# Patient Record
Sex: MALE | Race: BLACK OR AFRICAN AMERICAN | Employment: PART TIME | URBAN - METROPOLITAN AREA
[De-identification: names, ages, dates, MRNs, and addresses within clinical notes are randomized per-mention and may not be internally consistent; named-entity substitution may affect disease eponyms.]

---

## 2020-11-12 ENCOUNTER — HOSPITAL ENCOUNTER (EMERGENCY)
Age: 20
Discharge: HOME | End: 2020-11-12
Payer: MEDICAID

## 2020-11-12 VITALS
HEIGHT: 64 IN | SYSTOLIC BLOOD PRESSURE: 121 MMHG | BODY MASS INDEX: 42.68 KG/M2 | TEMPERATURE: 97.5 F | DIASTOLIC BLOOD PRESSURE: 79 MMHG | OXYGEN SATURATION: 98 % | WEIGHT: 250 LBS | HEART RATE: 72 BPM | RESPIRATION RATE: 20 BRPM

## 2020-11-12 PROCEDURE — 96372 THER/PROPH/DIAG INJ SC/IM: CPT

## 2020-11-12 PROCEDURE — 6360000002 HC RX W HCPCS: Performed by: PHYSICIAN ASSISTANT

## 2020-11-12 PROCEDURE — 99212 OFFICE O/P EST SF 10 MIN: CPT

## 2020-11-12 RX ORDER — AMOXICILLIN 875 MG/1
875 TABLET, COATED ORAL 2 TIMES DAILY
Qty: 20 TABLET | Refills: 0 | Status: SHIPPED | OUTPATIENT
Start: 2020-11-12 | End: 2020-11-22

## 2020-11-12 RX ORDER — DEXAMETHASONE SODIUM PHOSPHATE 10 MG/ML
10 INJECTION, SOLUTION INTRAMUSCULAR; INTRAVENOUS ONCE
Status: COMPLETED | OUTPATIENT
Start: 2020-11-12 | End: 2020-11-12

## 2020-11-12 RX ADMIN — DEXAMETHASONE SODIUM PHOSPHATE 10 MG: 10 INJECTION, SOLUTION INTRAMUSCULAR; INTRAVENOUS at 14:12

## 2020-11-12 ASSESSMENT — PAIN DESCRIPTION - PROGRESSION: CLINICAL_PROGRESSION: NOT CHANGED

## 2020-11-12 ASSESSMENT — PAIN DESCRIPTION - PAIN TYPE: TYPE: ACUTE PAIN

## 2020-11-12 ASSESSMENT — PAIN SCALES - GENERAL: PAINLEVEL_OUTOF10: 6

## 2020-11-12 ASSESSMENT — PAIN DESCRIPTION - DESCRIPTORS: DESCRIPTORS: SORE

## 2020-11-12 ASSESSMENT — PAIN DESCRIPTION - FREQUENCY: FREQUENCY: CONTINUOUS

## 2020-11-12 ASSESSMENT — PAIN - FUNCTIONAL ASSESSMENT
PAIN_FUNCTIONAL_ASSESSMENT: ACTIVITIES ARE NOT PREVENTED
PAIN_FUNCTIONAL_ASSESSMENT: 0-10

## 2020-11-12 ASSESSMENT — PAIN DESCRIPTION - LOCATION: LOCATION: THROAT

## 2020-11-12 ASSESSMENT — PAIN DESCRIPTION - ONSET: ONSET: ON-GOING

## 2020-11-12 NOTE — ED PROVIDER NOTES
This is a 77-year-old male that presents to urgent care complaining of a sore throat. There for the past several days and he was having difficulty opening his mouth he states and swallowing. He denies any coughing or choking. Denies any fevers or chills. On first contact patient he appears to be in no acute distress. Review of Systems   Constitutional:        Pertinent positives and negatives are stated within HPI, all other systems reviewed and are negative. Physical Exam  Vitals signs and nursing note reviewed. Constitutional:       Appearance: He is well-developed. HENT:      Head: Normocephalic and atraumatic. Jaw: There is normal jaw occlusion. No trismus. Right Ear: Hearing, tympanic membrane, ear canal and external ear normal.      Left Ear: Hearing, tympanic membrane, ear canal and external ear normal.      Nose: Nose normal.      Right Sinus: No maxillary sinus tenderness or frontal sinus tenderness. Left Sinus: No maxillary sinus tenderness or frontal sinus tenderness. Mouth/Throat:      Pharynx: Uvula midline. Pharyngeal swelling and posterior oropharyngeal erythema present. No oropharyngeal exudate or uvula swelling. Tonsils: No tonsillar abscesses. Comments: Oropharynx is patent. Eyes:      General: Lids are normal.      Conjunctiva/sclera: Conjunctivae normal.      Pupils: Pupils are equal, round, and reactive to light. Neck:      Musculoskeletal: Normal range of motion and neck supple. Cardiovascular:      Rate and Rhythm: Normal rate and regular rhythm. Heart sounds: Normal heart sounds. No murmur. Pulmonary:      Effort: Pulmonary effort is normal.      Breath sounds: Normal breath sounds. Abdominal:      General: Bowel sounds are normal.      Palpations: Abdomen is soft. Abdomen is not rigid. Tenderness: There is no abdominal tenderness. There is no guarding or rebound. Skin:     General: Skin is warm and dry. prognosis. Their questions are answered at this time and they are agreeable with the plan.      --------------------------------- ADDITIONAL PROVIDER NOTES ---------------------------------      This patient is stable for discharge. I have shared the specific conditions for return, as well as the importance of follow-up. * NOTE: This report was transcribed using voice recognition software. Every effort was made to ensure accuracy; however, inadvertent computerized transcription errors may be present.    --------------------------------- IMPRESSION AND DISPOSITION ---------------------------------    IMPRESSION  1.  Acute pharyngitis, unspecified etiology        DISPOSITION  Disposition: Discharge to home  Patient condition is good         Kari Pan PA-C  11/12/20 [id number]

## 2021-02-15 ENCOUNTER — OFFICE VISIT (OUTPATIENT)
Dept: FAMILY MEDICINE CLINIC | Age: 21
End: 2021-02-15
Payer: MEDICAID

## 2021-02-15 VITALS
HEIGHT: 64 IN | SYSTOLIC BLOOD PRESSURE: 108 MMHG | HEART RATE: 94 BPM | OXYGEN SATURATION: 96 % | TEMPERATURE: 96.6 F | DIASTOLIC BLOOD PRESSURE: 74 MMHG | RESPIRATION RATE: 16 BRPM | BODY MASS INDEX: 49.7 KG/M2 | WEIGHT: 291.1 LBS

## 2021-02-15 DIAGNOSIS — E66.01 MORBID OBESITY WITH BMI OF 45.0-49.9, ADULT (HCC): Primary | ICD-10-CM

## 2021-02-15 DIAGNOSIS — Z11.59 ENCOUNTER FOR HEPATITIS C SCREENING TEST FOR LOW RISK PATIENT: ICD-10-CM

## 2021-02-15 DIAGNOSIS — H66.002 NON-RECURRENT ACUTE SUPPURATIVE OTITIS MEDIA OF LEFT EAR WITHOUT SPONTANEOUS RUPTURE OF TYMPANIC MEMBRANE: ICD-10-CM

## 2021-02-15 DIAGNOSIS — Z11.4 ENCOUNTER FOR SCREENING FOR HIV: ICD-10-CM

## 2021-02-15 PROCEDURE — G8427 DOCREV CUR MEDS BY ELIG CLIN: HCPCS | Performed by: FAMILY MEDICINE

## 2021-02-15 PROCEDURE — 99203 OFFICE O/P NEW LOW 30 MIN: CPT | Performed by: FAMILY MEDICINE

## 2021-02-15 PROCEDURE — G8417 CALC BMI ABV UP PARAM F/U: HCPCS | Performed by: FAMILY MEDICINE

## 2021-02-15 PROCEDURE — G8484 FLU IMMUNIZE NO ADMIN: HCPCS | Performed by: FAMILY MEDICINE

## 2021-02-15 PROCEDURE — 1036F TOBACCO NON-USER: CPT | Performed by: FAMILY MEDICINE

## 2021-02-15 RX ORDER — AMOXICILLIN 875 MG/1
875 TABLET, COATED ORAL 2 TIMES DAILY
Qty: 20 TABLET | Refills: 0 | Status: SHIPPED | OUTPATIENT
Start: 2021-02-15 | End: 2021-02-25

## 2021-02-15 SDOH — ECONOMIC STABILITY: TRANSPORTATION INSECURITY
IN THE PAST 12 MONTHS, HAS THE LACK OF TRANSPORTATION KEPT YOU FROM MEDICAL APPOINTMENTS OR FROM GETTING MEDICATIONS?: NOT ASKED

## 2021-02-15 SDOH — HEALTH STABILITY: MENTAL HEALTH: HOW OFTEN DO YOU HAVE A DRINK CONTAINING ALCOHOL?: NEVER

## 2021-02-15 SDOH — ECONOMIC STABILITY: TRANSPORTATION INSECURITY
IN THE PAST 12 MONTHS, HAS LACK OF TRANSPORTATION KEPT YOU FROM MEETINGS, WORK, OR FROM GETTING THINGS NEEDED FOR DAILY LIVING?: NOT ASKED

## 2021-02-15 SDOH — ECONOMIC STABILITY: INCOME INSECURITY: HOW HARD IS IT FOR YOU TO PAY FOR THE VERY BASICS LIKE FOOD, HOUSING, MEDICAL CARE, AND HEATING?: NOT HARD AT ALL

## 2021-02-15 ASSESSMENT — PATIENT HEALTH QUESTIONNAIRE - PHQ9
SUM OF ALL RESPONSES TO PHQ QUESTIONS 1-9: 2
SUM OF ALL RESPONSES TO PHQ QUESTIONS 1-9: 2

## 2021-02-15 NOTE — PROGRESS NOTES
Subjective:  24 y.o. male who presents to the office today with chief complaint:  Chief Complaint   Patient presents with    New Patient     Has not been to a doctor for 2 years. Here to [facility] . Past Medical History: ADHD. Medications: Vyvanse 50 mg, trazodone 50 mg- prescribed at [facility]. Allergies: NKDA. Family History: Parents living, healthy. Has two siblings- healthy. Social:    Education: - [employer]. Employment: Potentially moving to [place], [place] soon. Diet: Described as \"good. \" Eats fruits and vegetables. Exercise: Gym daily. Caffeine: No    Tobacco: None. Alcohol: None. Illicit Drugs: Marijuana- occasional.       Health Maintenance Due   Topic Date Due    Hepatitis C screen  2000    Varicella vaccine (1 of 2 - 2-dose childhood series) 01/26/2001    HIV screen  01/26/2015    Hepatitis A vaccine (2 of 2 - 2-dose series) 09/05/2019    Flu vaccine (1) 09/01/2020       Cancer History: None. Family Cancer History: none. Review of Systems    Review of Systems: All bolded are positive, all others are negative. General:  Fever, chills, diaphoresis, fatigue, malaise, night sweats, weight loss  Psychological:  Anxiety, disorientation, hallucinations. ENT:  Epistaxis, headaches, vertigo, visual changes. Cardiovascular:  Chest pain, irregular heartbeats, palpitations, paroxysmal nocturnal dyspnea. Respiratory:  Shortness of breath, coughing, sputum production, hemoptysis, wheezing, orthopnea.   Gastrointestinal:  Nausea, vomiting, diarrhea, heartburn, constipation, abdominal pain, hematemesis, hematochezia, melena, acholic stools  Genito-Urinary:  Dysuria, urgency, frequency, hematuria  Musculoskeletal:  Joint pain, joint stiffness, joint swelling, muscle pain  Neurology:  Headache, focal neurological deficits, weakness, numbness, paresthesia  Derm:  Rashes, ulcers, excoriations, bruising Extremities:  Decreased ROM, peripheral edema, mottling      Objective:  Vitals:    02/15/21 1034   BP: 108/74   Pulse: 94   Resp: 16   Temp: 96.6 °F (35.9 °C)   SpO2: 96%     Physical Exam  Constitutional:       General: He is not in acute distress. Appearance: He is well-developed. He is obese. He is not diaphoretic. HENT:      Head: Normocephalic and atraumatic. Right Ear: Tympanic membrane, ear canal and external ear normal.      Left Ear: External ear normal.      Ears:      Comments: Left tympanic membrane erythematous. Left canal unremarkable. Nose: Nose normal.      Mouth/Throat:      Pharynx: No oropharyngeal exudate. Eyes:      General:         Right eye: No discharge. Left eye: No discharge. Conjunctiva/sclera: Conjunctivae normal.      Pupils: Pupils are equal, round, and reactive to light. Neck:      Musculoskeletal: Normal range of motion and neck supple. Cardiovascular:      Rate and Rhythm: Normal rate and regular rhythm. Heart sounds: Normal heart sounds. No murmur. No friction rub. No gallop. Pulmonary:      Effort: Pulmonary effort is normal. No respiratory distress. Breath sounds: Normal breath sounds. No wheezing or rales. Abdominal:      General: Bowel sounds are normal. There is no distension. Palpations: Abdomen is soft. Tenderness: There is no abdominal tenderness. There is no guarding or rebound. Lymphadenopathy:      Cervical: No cervical adenopathy. Neurological:      Mental Status: He is alert and oriented to person, place, and time. Psychiatric:         Behavior: Behavior normal.         Thought Content: Thought content normal.         Judgment: Judgment normal.         Results for orders placed or performed during the hospital encounter of 14   EKG 12 Lead   Result Value Ref Range    ECG Report       --- Pediatric criteria used ---Sinus arrhythmia. Normal ECGPREVIOUS TRACIN/12/10 19.36 PHYSICIAN Physician :   Enrico Vargas      ECG Heart Rate ECG HEART RATE: 63 /min /min    ECG RR Interval ECG RR INTERVAL: 952 ms ms    ECG P Duration ECG P DURATION: 96 ms ms    ECG QRS Duration ECG QRS DURATION: 80 ms ms    P-R Interval ECG NE INTERVAL: 152 ms ms    Q-T Interval ECG QT INTERVAL: 380 ms ms    ECG QTC Interval ECG QTC INTERVAL: 389 ms ms    ECG QT Dispersion ECG QT DISPERSION: 60 ms ms    P Axis ECG P AXIS: -17 deg deg    ECG QRS Axis ECG QRS AXIS: 34 deg deg    T Axis ECG T AXIS: 38 deg deg         Assessment and Plan:  Patsy was seen today for new patient. Diagnoses and all orders for this visit:    Morbid obesity with BMI of 45.0-49.9, adult ([facility] [address]  -     Comprehensive Metabolic Panel; Future  -     CBC; Future  -     TSH without Reflex; Future  -     Lipid Panel; Future    Encounter for screening for HIV  -     HIV-1 AND HIV-2 ANTIBODIES; Future    Encounter for hepatitis C screening test for low risk patient  -     HEPATITIS C ANTIBODY; Future    Non-recurrent acute suppurative otitis media of left ear without spontaneous rupture of tympanic membrane  -     amoxicillin (AMOXIL) 875 MG tablet; Take 1 tablet by mouth 2 times daily for 10 days        1. Encounter to establish care with new physician: Discussion held regarding medical and social history. Vital signs reviewed. Physical exam completed. 2. Left otitis media: Amoxicillin 875 mg twice daily for 10 days. 3. Morbid obesity: Labs will be collected. Patient gained 40 pounds in the last 4 months. Follow-up in 1 year    Patient may come in sooner if needed for medical concerns. Patient advised to call at any time to cancel or re-scheduleor for any questions/concerns. Please note that >15 minutes was spent face-to-face with the patient gathering history, performing physical exam, discussing findings, counseling the patient, and determining planforward. All questions and concerns addressed and answered. Abelardo Goode,    2/15/21  12:28 PM

## 2021-02-16 DIAGNOSIS — Z11.4 ENCOUNTER FOR SCREENING FOR HIV: ICD-10-CM

## 2021-02-16 DIAGNOSIS — E66.01 MORBID OBESITY WITH BMI OF 45.0-49.9, ADULT (HCC): ICD-10-CM

## 2021-02-16 DIAGNOSIS — Z11.59 ENCOUNTER FOR HEPATITIS C SCREENING TEST FOR LOW RISK PATIENT: ICD-10-CM

## 2021-02-16 LAB
ALBUMIN SERPL-MCNC: 4.6 G/DL (ref 3.5–5.2)
ALP BLD-CCNC: 89 U/L (ref 40–129)
ALT SERPL-CCNC: 64 U/L (ref 0–40)
ANION GAP SERPL CALCULATED.3IONS-SCNC: 18 MMOL/L (ref 7–16)
AST SERPL-CCNC: 39 U/L (ref 0–39)
BILIRUB SERPL-MCNC: 0.3 MG/DL (ref 0–1.2)
BUN BLDV-MCNC: 12 MG/DL (ref 6–20)
CALCIUM SERPL-MCNC: 10.3 MG/DL (ref 8.6–10.2)
CHLORIDE BLD-SCNC: 112 MMOL/L (ref 98–107)
CHOLESTEROL, TOTAL: 239 MG/DL (ref 0–199)
CO2: 19 MMOL/L (ref 22–29)
CREAT SERPL-MCNC: 0.9 MG/DL (ref 0.7–1.2)
GFR AFRICAN AMERICAN: >60
GFR NON-AFRICAN AMERICAN: >60 ML/MIN/1.73
GLUCOSE BLD-MCNC: 97 MG/DL (ref 74–99)
HCT VFR BLD CALC: 47.1 % (ref 37–54)
HDLC SERPL-MCNC: 34 MG/DL
HEMOGLOBIN: 15.3 G/DL (ref 12.5–16.5)
LDL CHOLESTEROL CALCULATED: 184 MG/DL (ref 0–99)
MCH RBC QN AUTO: 28.5 PG (ref 26–35)
MCHC RBC AUTO-ENTMCNC: 32.5 % (ref 32–34.5)
MCV RBC AUTO: 87.9 FL (ref 80–99.9)
PDW BLD-RTO: 13.4 FL (ref 11.5–15)
PLATELET # BLD: 289 E9/L (ref 130–450)
PMV BLD AUTO: 10.5 FL (ref 7–12)
POTASSIUM SERPL-SCNC: 4.3 MMOL/L (ref 3.5–5)
RBC # BLD: 5.36 E12/L (ref 3.8–5.8)
SODIUM BLD-SCNC: 149 MMOL/L (ref 132–146)
TOTAL PROTEIN: 8.3 G/DL (ref 6.4–8.3)
TRIGL SERPL-MCNC: 107 MG/DL (ref 0–149)
TSH SERPL DL<=0.05 MIU/L-ACNC: 2.61 UIU/ML (ref 0.27–4.2)
VLDLC SERPL CALC-MCNC: 21 MG/DL
WBC # BLD: 5.3 E9/L (ref 4.5–11.5)

## 2021-02-17 LAB
HEPATITIS C ANTIBODY INTERPRETATION: NORMAL
HIV-1 AND HIV-2 ANTIBODIES: REACTIVE

## 2021-02-24 LAB
HIV INTERPRETATION: ABNORMAL
HIV-1 ANTIBODY: POSITIVE
HIV-2 AB: NEGATIVE

## 2021-02-25 DIAGNOSIS — E87.0 HYPERNATREMIA: ICD-10-CM

## 2021-02-25 DIAGNOSIS — Z21 NEWLY DIAGNOSED HIV-POSITIVE (HCC): Primary | ICD-10-CM

## 2021-03-05 DIAGNOSIS — Z21 NEWLY DIAGNOSED HIV-POSITIVE (HCC): ICD-10-CM

## 2021-03-05 DIAGNOSIS — E87.0 HYPERNATREMIA: ICD-10-CM

## 2021-03-05 LAB
ALBUMIN SERPL-MCNC: 4.2 G/DL (ref 3.5–5.2)
ALP BLD-CCNC: 68 U/L (ref 40–129)
ALT SERPL-CCNC: 52 U/L (ref 0–40)
ANION GAP SERPL CALCULATED.3IONS-SCNC: 10 MMOL/L (ref 7–16)
AST SERPL-CCNC: 32 U/L (ref 0–39)
BILIRUB SERPL-MCNC: 0.2 MG/DL (ref 0–1.2)
BUN BLDV-MCNC: 11 MG/DL (ref 6–20)
CALCIUM SERPL-MCNC: 9.1 MG/DL (ref 8.6–10.2)
CHLORIDE BLD-SCNC: 105 MMOL/L (ref 98–107)
CO2: 27 MMOL/L (ref 22–29)
CREAT SERPL-MCNC: 0.9 MG/DL (ref 0.7–1.2)
GFR AFRICAN AMERICAN: >60
GFR NON-AFRICAN AMERICAN: >60 ML/MIN/1.73
GLUCOSE BLD-MCNC: 99 MG/DL (ref 74–99)
POTASSIUM SERPL-SCNC: 4.2 MMOL/L (ref 3.5–5)
SODIUM BLD-SCNC: 142 MMOL/L (ref 132–146)
TOTAL PROTEIN: 7.4 G/DL (ref 6.4–8.3)

## 2021-03-13 LAB
DIRECT EXAM: NORMAL
SPECIMEN: NORMAL

## 2021-04-01 ENCOUNTER — HOSPITAL ENCOUNTER (OUTPATIENT)
Age: 21
Discharge: HOME | End: 2021-04-03
Payer: MEDICAID

## 2021-04-01 ENCOUNTER — HOSPITAL ENCOUNTER (OUTPATIENT)
Age: 21
Discharge: HOME | End: 2021-04-01
Payer: MEDICAID

## 2021-04-01 DIAGNOSIS — B20 HIV INFECTION, UNSPECIFIED SYMPTOM STATUS (HCC): ICD-10-CM

## 2021-04-01 LAB
ALBUMIN SERPL-MCNC: 4.4 G/DL (ref 3.5–5.2)
ALP BLD-CCNC: 78 U/L (ref 40–129)
ALT SERPL-CCNC: 75 U/L (ref 0–40)
ANION GAP SERPL CALCULATED.3IONS-SCNC: 10 MMOL/L (ref 7–16)
AST SERPL-CCNC: 39 U/L (ref 0–39)
BASOPHILS ABSOLUTE: 0.02 E9/L (ref 0–0.2)
BASOPHILS RELATIVE PERCENT: 0.4 % (ref 0–2)
BILIRUB SERPL-MCNC: 0.4 MG/DL (ref 0–1.2)
BILIRUBIN URINE: NEGATIVE
BLOOD, URINE: NEGATIVE
BUN BLDV-MCNC: 9 MG/DL (ref 6–20)
CALCIUM SERPL-MCNC: 9.2 MG/DL (ref 8.6–10.2)
CHLORIDE BLD-SCNC: 103 MMOL/L (ref 98–107)
CLARITY: CLEAR
CO2: 23 MMOL/L (ref 22–29)
COLOR: YELLOW
CREAT SERPL-MCNC: 0.8 MG/DL (ref 0.7–1.2)
EOSINOPHILS ABSOLUTE: 0.09 E9/L (ref 0.05–0.5)
EOSINOPHILS RELATIVE PERCENT: 1.8 % (ref 0–6)
GFR AFRICAN AMERICAN: >60
GFR NON-AFRICAN AMERICAN: >60 ML/MIN/1.73
GLUCOSE BLD-MCNC: 99 MG/DL (ref 74–99)
GLUCOSE URINE: NEGATIVE MG/DL
HCT VFR BLD CALC: 45.9 % (ref 37–54)
HEMOGLOBIN: 15.1 G/DL (ref 12.5–16.5)
IMMATURE GRANULOCYTES #: 0.01 E9/L
IMMATURE GRANULOCYTES %: 0.2 % (ref 0–5)
KETONES, URINE: NEGATIVE MG/DL
LEUKOCYTE ESTERASE, URINE: NEGATIVE
LYMPHOCYTES ABSOLUTE: 2.41 E9/L (ref 1.5–4)
LYMPHOCYTES RELATIVE PERCENT: 49.5 % (ref 20–42)
MCH RBC QN AUTO: 28.5 PG (ref 26–35)
MCHC RBC AUTO-ENTMCNC: 32.9 % (ref 32–34.5)
MCV RBC AUTO: 86.8 FL (ref 80–99.9)
MONOCYTES ABSOLUTE: 0.66 E9/L (ref 0.1–0.95)
MONOCYTES RELATIVE PERCENT: 13.6 % (ref 2–12)
NEUTROPHILS ABSOLUTE: 1.68 E9/L (ref 1.8–7.3)
NEUTROPHILS RELATIVE PERCENT: 34.5 % (ref 43–80)
NITRITE, URINE: NEGATIVE
PDW BLD-RTO: 13.2 FL (ref 11.5–15)
PH UA: 6 (ref 5–9)
PLATELET # BLD: 295 E9/L (ref 130–450)
PMV BLD AUTO: 9.6 FL (ref 7–12)
POTASSIUM SERPL-SCNC: 3.9 MMOL/L (ref 3.5–5)
PROTEIN UA: NEGATIVE MG/DL
RBC # BLD: 5.29 E12/L (ref 3.8–5.8)
SEDIMENTATION RATE, ERYTHROCYTE: 9 MM/HR (ref 0–15)
SODIUM BLD-SCNC: 136 MMOL/L (ref 132–146)
SPECIFIC GRAVITY UA: >=1.03 (ref 1–1.03)
TOTAL PROTEIN: 7.7 G/DL (ref 6.4–8.3)
UROBILINOGEN, URINE: 0.2 E.U./DL
WBC # BLD: 4.9 E9/L (ref 4.5–11.5)

## 2021-04-01 PROCEDURE — 87536 HIV-1 QUANT&REVRSE TRNSCRPJ: CPT

## 2021-04-01 PROCEDURE — 86481 TB AG RESPONSE T-CELL SUSP: CPT

## 2021-04-01 PROCEDURE — 85025 COMPLETE CBC W/AUTO DIFF WBC: CPT

## 2021-04-01 PROCEDURE — 80053 COMPREHEN METABOLIC PANEL: CPT

## 2021-04-01 PROCEDURE — 86357 NK CELLS TOTAL COUNT: CPT

## 2021-04-01 PROCEDURE — 86706 HEP B SURFACE ANTIBODY: CPT

## 2021-04-01 PROCEDURE — 86708 HEPATITIS A ANTIBODY: CPT

## 2021-04-01 PROCEDURE — 87491 CHLMYD TRACH DNA AMP PROBE: CPT

## 2021-04-01 PROCEDURE — 86704 HEP B CORE ANTIBODY TOTAL: CPT

## 2021-04-01 PROCEDURE — 81003 URINALYSIS AUTO W/O SCOPE: CPT

## 2021-04-01 PROCEDURE — 87340 HEPATITIS B SURFACE AG IA: CPT

## 2021-04-01 PROCEDURE — 87591 N.GONORRHOEAE DNA AMP PROB: CPT

## 2021-04-01 PROCEDURE — 86359 T CELLS TOTAL COUNT: CPT

## 2021-04-01 PROCEDURE — 86592 SYPHILIS TEST NON-TREP QUAL: CPT

## 2021-04-01 PROCEDURE — 81381 HLA I TYPING 1 ALLELE HR: CPT

## 2021-04-01 PROCEDURE — 86360 T CELL ABSOLUTE COUNT/RATIO: CPT

## 2021-04-01 PROCEDURE — 86403 PARTICLE AGGLUT ANTBDY SCRN: CPT

## 2021-04-01 PROCEDURE — 85651 RBC SED RATE NONAUTOMATED: CPT

## 2021-04-01 PROCEDURE — 36415 COLL VENOUS BLD VENIPUNCTURE: CPT

## 2021-04-02 LAB
CRYPTOCOCCAL ANTIGEN: NEGATIVE
HAV AB SERPL IA-ACNC: POSITIVE
HBV SURFACE AB TITR SER: NORMAL {TITER}
HEPATITIS B SURFACE ANTIGEN INTERPRETATION: NORMAL
RPR: NORMAL

## 2021-04-03 LAB
% CD 3 POS. LYMPH.: 89 % (ref 62–87)
% CD19: 6 % (ref 6–23)
% CD4: 15 % (ref 32–64)
% CD8: 72 % (ref 15–46)
% NK (CD56/16): 3 % (ref 4–26)
ABSOLUTE CD 3: 2274 CELLS/UL (ref 570–2400)
ABSOLUTE CD 4 HELPER: 390 CELLS/UL (ref 430–1800)
ABSOLUTE CD 8 (SUPP): 1824 CELLS/UL (ref 210–1200)
CD19 ABSOLUTE: 165 CELLS/UL (ref 91–610)
CD4/CD8 RATIO: 0.21 RATIO (ref 0.8–3.9)
HEPATITIS B CORE TOTAL ANTIBODY: NONREACTIVE
LYMPHOCYTE SUBSET PANEL 5 INFO: ABNORMAL
NATURAL KILLER CD16 AND CD56 ABSOLUTE: 88 CELLS/UL (ref 78–470)

## 2021-04-04 LAB
COMMENT: NORMAL
DIRECT EXAM: NORMAL
REPORT: NORMAL
SPECIMEN: NORMAL

## 2021-04-05 LAB
C. TRACHOMATIS DNA ,URINE: NEGATIVE
N. GONORRHOEAE DNA, URINE: NEGATIVE
SOURCE: NORMAL

## 2021-04-19 LAB
HLA-B*5701 GENOTYPING: NEGATIVE
HLA-B*5701 SPECIMEN: NORMAL

## 2021-04-26 ENCOUNTER — HOSPITAL ENCOUNTER (OUTPATIENT)
Dept: GENERAL RADIOLOGY | Age: 21
Discharge: HOME | End: 2021-04-28
Payer: MEDICAID

## 2021-04-26 PROCEDURE — 71046 X-RAY EXAM CHEST 2 VIEWS: CPT

## 2021-05-12 ENCOUNTER — HOSPITAL ENCOUNTER (OUTPATIENT)
Age: 21
Discharge: HOME | End: 2021-05-12
Payer: MEDICAID

## 2021-05-12 LAB
ALBUMIN SERPL-MCNC: 4.5 G/DL (ref 3.5–5.2)
ALP BLD-CCNC: 84 U/L (ref 40–129)
ALT SERPL-CCNC: 49 U/L (ref 0–40)
ANION GAP SERPL CALCULATED.3IONS-SCNC: 10 MMOL/L (ref 7–16)
AST SERPL-CCNC: 31 U/L (ref 0–39)
BASOPHILS ABSOLUTE: 0.02 E9/L (ref 0–0.2)
BASOPHILS RELATIVE PERCENT: 0.4 % (ref 0–2)
BILIRUB SERPL-MCNC: 0.2 MG/DL (ref 0–1.2)
BUN BLDV-MCNC: 13 MG/DL (ref 6–20)
CALCIUM SERPL-MCNC: 9.2 MG/DL (ref 8.6–10.2)
CHLORIDE BLD-SCNC: 102 MMOL/L (ref 98–107)
CO2: 25 MMOL/L (ref 22–29)
CREAT SERPL-MCNC: 0.9 MG/DL (ref 0.7–1.2)
EOSINOPHILS ABSOLUTE: 0.05 E9/L (ref 0.05–0.5)
EOSINOPHILS RELATIVE PERCENT: 0.9 % (ref 0–6)
GFR AFRICAN AMERICAN: >60
GFR NON-AFRICAN AMERICAN: >60 ML/MIN/1.73
GLUCOSE BLD-MCNC: 106 MG/DL (ref 74–99)
HCT VFR BLD CALC: 46.1 % (ref 37–54)
HEMOGLOBIN: 14.8 G/DL (ref 12.5–16.5)
IMMATURE GRANULOCYTES #: 0.01 E9/L
IMMATURE GRANULOCYTES %: 0.2 % (ref 0–5)
LYMPHOCYTES ABSOLUTE: 2.04 E9/L (ref 1.5–4)
LYMPHOCYTES RELATIVE PERCENT: 36.7 % (ref 20–42)
MCH RBC QN AUTO: 28.7 PG (ref 26–35)
MCHC RBC AUTO-ENTMCNC: 32.1 % (ref 32–34.5)
MCV RBC AUTO: 89.5 FL (ref 80–99.9)
MONOCYTES ABSOLUTE: 0.66 E9/L (ref 0.1–0.95)
MONOCYTES RELATIVE PERCENT: 11.9 % (ref 2–12)
NEUTROPHILS ABSOLUTE: 2.78 E9/L (ref 1.8–7.3)
NEUTROPHILS RELATIVE PERCENT: 49.9 % (ref 43–80)
PDW BLD-RTO: 13.6 FL (ref 11.5–15)
PLATELET # BLD: 365 E9/L (ref 130–450)
PMV BLD AUTO: 9.5 FL (ref 7–12)
POTASSIUM SERPL-SCNC: 4.2 MMOL/L (ref 3.5–5)
RBC # BLD: 5.15 E12/L (ref 3.8–5.8)
SODIUM BLD-SCNC: 137 MMOL/L (ref 132–146)
TOTAL PROTEIN: 7.9 G/DL (ref 6.4–8.3)
WBC # BLD: 5.6 E9/L (ref 4.5–11.5)

## 2021-05-12 PROCEDURE — 85025 COMPLETE CBC W/AUTO DIFF WBC: CPT

## 2021-05-12 PROCEDURE — 86360 T CELL ABSOLUTE COUNT/RATIO: CPT

## 2021-05-12 PROCEDURE — 36415 COLL VENOUS BLD VENIPUNCTURE: CPT

## 2021-05-12 PROCEDURE — 86357 NK CELLS TOTAL COUNT: CPT

## 2021-05-12 PROCEDURE — 80053 COMPREHEN METABOLIC PANEL: CPT

## 2021-05-12 PROCEDURE — 86359 T CELLS TOTAL COUNT: CPT

## 2021-05-12 PROCEDURE — 87536 HIV-1 QUANT&REVRSE TRNSCRPJ: CPT

## 2021-05-14 LAB
% CD 3 POS. LYMPH.: 82 % (ref 62–87)
% CD19: 14 % (ref 6–23)
% CD4: 23 % (ref 32–64)
% CD8: 55 % (ref 15–46)
% NK (CD56/16): 4 % (ref 4–26)
ABSOLUTE CD 3: 1927 CELLS/UL (ref 570–2400)
ABSOLUTE CD 4 HELPER: 540 CELLS/UL (ref 430–1800)
ABSOLUTE CD 8 (SUPP): 1303 CELLS/UL (ref 210–1200)
CD19 ABSOLUTE: 319 CELLS/UL (ref 91–610)
CD4/CD8 RATIO: 0.42 RATIO (ref 0.8–3.9)
LYMPHOCYTE SUBSET PANEL 5 INFO: ABNORMAL
NATURAL KILLER CD16 AND CD56 ABSOLUTE: 105 CELLS/UL (ref 78–470)

## 2021-05-18 LAB
DIRECT EXAM: NORMAL
SPECIMEN: NORMAL

## 2021-05-28 ENCOUNTER — HOSPITAL ENCOUNTER (EMERGENCY)
Age: 21
Discharge: HOME | End: 2021-05-28
Attending: EMERGENCY MEDICINE
Payer: MEDICAID

## 2021-05-28 ENCOUNTER — APPOINTMENT (OUTPATIENT)
Dept: CT IMAGING | Age: 21
End: 2021-05-28
Payer: MEDICAID

## 2021-05-28 VITALS
HEIGHT: 64 IN | OXYGEN SATURATION: 100 % | TEMPERATURE: 98.5 F | DIASTOLIC BLOOD PRESSURE: 74 MMHG | WEIGHT: 294 LBS | BODY MASS INDEX: 50.19 KG/M2 | HEART RATE: 54 BPM | RESPIRATION RATE: 18 BRPM | SYSTOLIC BLOOD PRESSURE: 129 MMHG

## 2021-05-28 DIAGNOSIS — R91.1 PULMONARY NODULE: ICD-10-CM

## 2021-05-28 DIAGNOSIS — J06.9 ACUTE UPPER RESPIRATORY INFECTION: Primary | ICD-10-CM

## 2021-05-28 LAB
ALBUMIN SERPL-MCNC: 4.2 G/DL (ref 3.5–5.2)
ALP BLD-CCNC: 90 U/L (ref 40–129)
ALT SERPL-CCNC: 48 U/L (ref 0–40)
ANION GAP SERPL CALCULATED.3IONS-SCNC: 11 MMOL/L (ref 7–16)
AST SERPL-CCNC: 29 U/L (ref 0–39)
BASOPHILS ABSOLUTE: 0.04 E9/L (ref 0–0.2)
BASOPHILS RELATIVE PERCENT: 0.6 % (ref 0–2)
BILIRUB SERPL-MCNC: 0.3 MG/DL (ref 0–1.2)
BILIRUBIN URINE: NEGATIVE
BLOOD, URINE: NEGATIVE
BUN BLDV-MCNC: 10 MG/DL (ref 6–20)
CALCIUM SERPL-MCNC: 9.3 MG/DL (ref 8.6–10.2)
CHLORIDE BLD-SCNC: 101 MMOL/L (ref 98–107)
CLARITY: CLEAR
CO2: 24 MMOL/L (ref 22–29)
COLOR: YELLOW
CREAT SERPL-MCNC: 0.8 MG/DL (ref 0.7–1.2)
EOSINOPHILS ABSOLUTE: 0.36 E9/L (ref 0.05–0.5)
EOSINOPHILS RELATIVE PERCENT: 5.6 % (ref 0–6)
GFR AFRICAN AMERICAN: >60
GFR NON-AFRICAN AMERICAN: >60 ML/MIN/1.73
GLUCOSE BLD-MCNC: 94 MG/DL (ref 74–99)
GLUCOSE URINE: NEGATIVE MG/DL
HCT VFR BLD CALC: 48.9 % (ref 37–54)
HEMOGLOBIN: 15.9 G/DL (ref 12.5–16.5)
IMMATURE GRANULOCYTES #: 0.02 E9/L
IMMATURE GRANULOCYTES %: 0.3 % (ref 0–5)
KETONES, URINE: NEGATIVE MG/DL
LEUKOCYTE ESTERASE, URINE: NEGATIVE
LYMPHOCYTES ABSOLUTE: 2.83 E9/L (ref 1.5–4)
LYMPHOCYTES RELATIVE PERCENT: 43.7 % (ref 20–42)
MCH RBC QN AUTO: 29.4 PG (ref 26–35)
MCHC RBC AUTO-ENTMCNC: 32.5 % (ref 32–34.5)
MCV RBC AUTO: 90.6 FL (ref 80–99.9)
MONOCYTES ABSOLUTE: 0.68 E9/L (ref 0.1–0.95)
MONOCYTES RELATIVE PERCENT: 10.5 % (ref 2–12)
NEUTROPHILS ABSOLUTE: 2.55 E9/L (ref 1.8–7.3)
NEUTROPHILS RELATIVE PERCENT: 39.3 % (ref 43–80)
NITRITE, URINE: NEGATIVE
PDW BLD-RTO: 14 FL (ref 11.5–15)
PH UA: 6.5 (ref 5–9)
PLATELET # BLD: 312 E9/L (ref 130–450)
PMV BLD AUTO: 9.6 FL (ref 7–12)
POTASSIUM REFLEX MAGNESIUM: 4.1 MMOL/L (ref 3.5–5)
PROTEIN UA: NEGATIVE MG/DL
RBC # BLD: 5.4 E12/L (ref 3.8–5.8)
SODIUM BLD-SCNC: 136 MMOL/L (ref 132–146)
SPECIFIC GRAVITY UA: 1.02 (ref 1–1.03)
TOTAL PROTEIN: 8.2 G/DL (ref 6.4–8.3)
TROPONIN, HIGH SENSITIVITY: <6 NG/L (ref 0–11)
UROBILINOGEN, URINE: 0.2 E.U./DL
WBC # BLD: 6.5 E9/L (ref 4.5–11.5)

## 2021-05-28 PROCEDURE — 84484 ASSAY OF TROPONIN QUANT: CPT

## 2021-05-28 PROCEDURE — 80053 COMPREHEN METABOLIC PANEL: CPT

## 2021-05-28 PROCEDURE — 93005 ELECTROCARDIOGRAM TRACING: CPT | Performed by: PHYSICIAN ASSISTANT

## 2021-05-28 PROCEDURE — 6360000004 HC RX CONTRAST MEDICATION: Performed by: RADIOLOGY

## 2021-05-28 PROCEDURE — 85025 COMPLETE CBC W/AUTO DIFF WBC: CPT

## 2021-05-28 PROCEDURE — 99283 EMERGENCY DEPT VISIT LOW MDM: CPT

## 2021-05-28 PROCEDURE — 81003 URINALYSIS AUTO W/O SCOPE: CPT

## 2021-05-28 PROCEDURE — 71275 CT ANGIOGRAPHY CHEST: CPT

## 2021-05-28 RX ORDER — AZITHROMYCIN 250 MG/1
TABLET, FILM COATED ORAL
Qty: 1 PACKET | Refills: 0 | Status: SHIPPED | OUTPATIENT
Start: 2021-05-28 | End: 2021-06-07

## 2021-05-28 RX ORDER — BROMPHENIRAMINE MALEATE, PSEUDOEPHEDRINE HYDROCHLORIDE, AND DEXTROMETHORPHAN HYDROBROMIDE 2; 30; 10 MG/5ML; MG/5ML; MG/5ML
5 SYRUP ORAL 4 TIMES DAILY PRN
Qty: 1 BOTTLE | Refills: 0 | Status: SHIPPED | OUTPATIENT
Start: 2021-05-28 | End: 2021-08-05 | Stop reason: ALTCHOICE

## 2021-05-28 RX ADMIN — IOPAMIDOL 75 ML: 755 INJECTION, SOLUTION INTRAVENOUS at 21:08

## 2021-05-28 ASSESSMENT — ENCOUNTER SYMPTOMS
CHEST TIGHTNESS: 0
ABDOMINAL PAIN: 0
SHORTNESS OF BREATH: 0
COUGH: 1

## 2021-05-28 ASSESSMENT — PAIN DESCRIPTION - LOCATION: LOCATION: CHEST

## 2021-05-28 ASSESSMENT — PAIN DESCRIPTION - PAIN TYPE: TYPE: ACUTE PAIN

## 2021-05-28 NOTE — LETTER
[facility] Emergency Department  [address] [place] [id number]  Phone: [phone]               May 28, 2021    Patient: Yumi Srivastava   YOB: 2000   Date of Visit: 5/28/2021       To Whom It May Concern:    Yumi Srivastava was seen and treated in our emergency department on 5/28/2021. He may return to work on 5/29/2021.       Sincerely,       Jennifer Solomon RN         Signature:__________________________________

## 2021-05-28 NOTE — ED NOTES
FIRST PROVIDER CONTACT ASSESSMENT NOTE      Department of Emergency Medicine   Admit Date: No admission date for patient encounter. Chief Complaint: Hemoptysis (began today, cough x 1 week ) and Shortness of Breath      History of Present Illness:   Ember Dominguez is a 24 y.o. male who presents to the ED for hemoptysis starting today as well as cough x 1 week. Did have recent travel to [place] 2 weeks ago (flew)).  Denies leg pain or swelling.         -----------------END OF FIRST PROVIDER CONTACT ASSESSMENT NOTE--------------  Electronically signed by GILDA Houser   DD: 5/28/21               Jennifer Houser  05/28/21 1935

## 2021-05-29 LAB
EKG ATRIAL RATE: 66 BPM
EKG P AXIS: 22 DEGREES
EKG P-R INTERVAL: 164 MS
EKG Q-T INTERVAL: 366 MS
EKG QRS DURATION: 82 MS
EKG QTC CALCULATION (BAZETT): 383 MS
EKG R AXIS: 11 DEGREES
EKG T AXIS: 24 DEGREES
EKG VENTRICULAR RATE: 66 BPM

## 2021-05-29 NOTE — ED PROVIDER NOTES
23 yo male presenting with 1 week of cough. He also started having some hemoptysis later on and that all started today. No current or mopped assist, no hypoxia, no respiratory distress. He does have HIV, reports taking the medications like he supposed to. No fevers or chills or nausea or vomiting, is not on prophylactic antibiotics, he could not tell me what his last CD4 count was, is not on other antibiotics at this time. He is awake calm alert, oriented x4. No distress, hypoxia. This is a week long episode of coughing now associate with mops this. Mild severity, 1 to 8-week duration, intermittent, gradual onset. History reviewed. No pertinent family history. Past Surgical History:   Procedure Laterality Date    ECHO COMPL W DOP COLOR FLOW  1/23/2014            Review of Systems   Constitutional: Negative for chills and fever. Respiratory: Positive for cough. Negative for chest tightness and shortness of breath. Hemoptysis     Cardiovascular: Negative for chest pain. Gastrointestinal: Negative for abdominal pain. All other systems reviewed and are negative. Physical Exam  Constitutional:       General: He is not in acute distress. Appearance: He is well-developed. HENT:      Head: Normocephalic and atraumatic. Eyes:      Pupils: Pupils are equal, round, and reactive to light. Neck:      Thyroid: No thyromegaly. Cardiovascular:      Rate and Rhythm: Normal rate and regular rhythm. Pulmonary:      Effort: Pulmonary effort is normal. No respiratory distress. Breath sounds: Normal breath sounds. No wheezing. Chest:      Chest wall: No deformity or tenderness. Abdominal:      General: There is no distension. Palpations: Abdomen is soft. There is no mass. Tenderness: There is no abdominal tenderness. There is no guarding or rebound. Musculoskeletal:         General: No tenderness. Normal range of motion.       Cervical back: Normal range of motion and neck supple. Skin:     General: Skin is warm and dry. Findings: No erythema. Neurological:      Mental Status: He is alert and oriented to person, place, and time. Cranial Nerves: No cranial nerve deficit. Psychiatric:         Mood and Affect: Mood normal. Mood is not anxious. Behavior: Behavior normal.          Procedures     [facility]              --------------------------------------------- PAST HISTORY ---------------------------------------------  Past Medical History:  has a past medical history of History of cardiovascular stress test.    Past Surgical History:  has a past surgical history that includes ECHO Compl W Dop Color Flow (1/23/2014). Social History:  reports that he has never smoked. He has never used smokeless tobacco. He reports current alcohol use. He reports current drug use. Drug: Marijuana. Family History: family history is not on file. The patients home medications have been reviewed. Allergies: Patient has no known allergies.     -------------------------------------------------- RESULTS -------------------------------------------------  Labs:  Results for orders placed or performed during the hospital encounter of 05/28/21   CBC Auto Differential   Result Value Ref Range    WBC 6.5 4.5 - 11.5 E9/L    RBC 5.40 3.80 - 5.80 E12/L    Hemoglobin 15.9 12.5 - 16.5 g/dL    Hematocrit 48.9 37.0 - 54.0 %    MCV 90.6 80.0 - 99.9 fL    MCH 29.4 26.0 - 35.0 pg    MCHC 32.5 32.0 - 34.5 %    RDW 14.0 11.5 - 15.0 fL    Platelets 530 179 - 484 E9/L    MPV 9.6 7.0 - 12.0 fL    Neutrophils % 39.3 (L) 43.0 - 80.0 %    Immature Granulocytes % 0.3 0.0 - 5.0 %    Lymphocytes % 43.7 (H) 20.0 - 42.0 %    Monocytes % 10.5 2.0 - 12.0 %    Eosinophils % 5.6 0.0 - 6.0 %    Basophils % 0.6 0.0 - 2.0 %    Neutrophils Absolute 2.55 1.80 - 7.30 E9/L    Immature Granulocytes # 0.02 E9/L    Lymphocytes Absolute 2.83 1.50 - 4.00 E9/L    Monocytes Absolute 0.68 0.10 - 0.95 E9/L Eosinophils Absolute 0.36 0.05 - 0.50 E9/L    Basophils Absolute 0.04 0.00 - 0.20 E9/L   Comprehensive Metabolic Panel w/ Reflex to MG   Result Value Ref Range    Sodium 136 132 - 146 mmol/L    Potassium reflex Magnesium 4.1 3.5 - 5.0 mmol/L    Chloride 101 98 - 107 mmol/L    CO2 24 22 - 29 mmol/L    Anion Gap 11 7 - 16 mmol/L    Glucose 94 74 - 99 mg/dL    BUN 10 6 - 20 mg/dL    CREATININE 0.8 0.7 - 1.2 mg/dL    GFR Non-African American >60 >=60 mL/min/1.73    GFR African American >60     Calcium 9.3 8.6 - 10.2 mg/dL    Total Protein 8.2 6.4 - 8.3 g/dL    Albumin 4.2 3.5 - 5.2 g/dL    Total Bilirubin 0.3 0.0 - 1.2 mg/dL    Alkaline Phosphatase 90 40 - 129 U/L    ALT 48 (H) 0 - 40 U/L    AST 29 0 - 39 U/L   Troponin   Result Value Ref Range    Troponin, High Sensitivity <6 0 - 11 ng/L   Urinalysis, reflex to microscopic   Result Value Ref Range    Color, UA Yellow Straw/Yellow    Clarity, UA Clear Clear    Glucose, Ur Negative Negative mg/dL    Bilirubin Urine Negative Negative    Ketones, Urine Negative Negative mg/dL    Specific Gravity, UA 1.025 1.005 - 1.030    Blood, Urine Negative Negative    pH, UA 6.5 5.0 - 9.0    Protein, UA Negative Negative mg/dL    Urobilinogen, Urine 0.2 <2.0 E.U./dL    Nitrite, Urine Negative Negative    Leukocyte Esterase, Urine Negative Negative   EKG 12 Lead   Result Value Ref Range    Ventricular Rate 66 BPM    Atrial Rate 66 BPM    P-R Interval 164 ms    QRS Duration 82 ms    Q-T Interval 366 ms    QTc Calculation (Bazett) 383 ms    P Axis 22 degrees    R Axis 11 degrees    T Axis 24 degrees       Radiology:  CTA PULMONARY W CONTRAST   Final Result   Addendum 1 of 1   ADDENDUM:   Repeat images obtained at 2213 hours after re administration of    intravenous   contrast show more adequate opacification of the pulmonary arteries. There is no evidence of pulmonary emboli. Subsegmental pulmonary arteries are suboptimally evaluated due to    respiratory   motion artifact. Final   Very limited opacification of the pulmonary arteries. Significant pulmonary   emboli cannot be excluded based on this examination. Recommend repeat or   correlation with VQ scan      Small focal area of ground-glass nodularity in the right upper lobe measuring   approximately 9 mm in diameter. In this age group this is likely   inflammatory. Short interval follow-up recommended to assess for resolution. No other areas of airspace consolidation or pleural effusions.                ------------------------- NURSING NOTES AND VITALS REVIEWED ---------------------------  Date / Time Roomed:  5/28/2021  8:20 PM  ED Bed Assignment:  25/25    The nursing notes within the ED encounter and vital signs as below have been reviewed. /74   Pulse 54   Temp 98.5 °F (36.9 °C) (Oral)   Resp 18   Ht 5' 4\" (1.626 m)   Wt 294 lb (133.4 kg)   SpO2 100%   BMI 50.46 kg/m²   Oxygen Saturation Interpretation: Normal      ------------------------------------------ PROGRESS NOTES ------------------------------------------  I have spoken with the patient and discussed todays results, in addition to providing specific details for the plan of care and counseling regarding the diagnosis and prognosis. Their questions are answered at this time and they are agreeable with the plan. I discussed at length with them reasons for immediate return here for re evaluation. They will followup with primary care by calling their office tomorrow. --------------------------------- ADDITIONAL PROVIDER NOTES ---------------------------------  At this time the patient is without objective evidence of an acute process requiring hospitalization or inpatient management. They have remained hemodynamically stable throughout their entire ED visit and are stable for discharge with outpatient follow-up.      The plan has been discussed in detail and they are aware of the specific conditions for emergent return, as well as the importance of follow-up. New Prescriptions    AZITHROMYCIN (ZITHROMAX Z-SHARON) 250 MG TABLET    Take 2 tabs on day one, followed by 1 tablet daily for 4 days. BROMPHENIRAMINE-PSEUDOEPHEDRINE-DM (BROMFED DM) 2-30-10 MG/5ML SYRUP    Take 5 mLs by mouth 4 times daily as needed for Cough       Diagnosis:  1. Acute upper respiratory infection    2. Pulmonary nodule        Disposition:  Patient's disposition: Discharge to home  Patient's condition is stable.               Paul Hammond DO  05/28/21 [id number]

## 2021-06-26 ENCOUNTER — HOSPITAL ENCOUNTER (EMERGENCY)
Age: 21
Discharge: HOME | End: 2021-06-26
Payer: MEDICAID

## 2021-06-26 VITALS
SYSTOLIC BLOOD PRESSURE: 115 MMHG | OXYGEN SATURATION: 98 % | HEIGHT: 64 IN | WEIGHT: 289 LBS | TEMPERATURE: 98 F | RESPIRATION RATE: 18 BRPM | DIASTOLIC BLOOD PRESSURE: 77 MMHG | HEART RATE: 60 BPM | BODY MASS INDEX: 49.34 KG/M2

## 2021-06-26 DIAGNOSIS — K08.89 PAIN, DENTAL: Primary | ICD-10-CM

## 2021-06-26 PROCEDURE — 99211 OFF/OP EST MAY X REQ PHY/QHP: CPT

## 2021-06-26 RX ORDER — IBUPROFEN 600 MG/1
600 TABLET ORAL EVERY 6 HOURS PRN
Qty: 20 TABLET | Refills: 0 | Status: SHIPPED | OUTPATIENT
Start: 2021-06-26 | End: 2021-08-05 | Stop reason: ALTCHOICE

## 2021-06-26 RX ORDER — PENICILLIN V POTASSIUM 500 MG/1
500 TABLET ORAL 4 TIMES DAILY
Qty: 40 TABLET | Refills: 0 | Status: SHIPPED | OUTPATIENT
Start: 2021-06-26 | End: 2021-07-06

## 2021-06-26 ASSESSMENT — PAIN DESCRIPTION - FREQUENCY: FREQUENCY: CONTINUOUS

## 2021-06-26 ASSESSMENT — PAIN DESCRIPTION - PROGRESSION: CLINICAL_PROGRESSION: GRADUALLY WORSENING

## 2021-06-26 ASSESSMENT — PAIN DESCRIPTION - ONSET: ONSET: ON-GOING

## 2021-06-26 ASSESSMENT — PAIN DESCRIPTION - ORIENTATION: ORIENTATION: RIGHT;LOWER;POSTERIOR

## 2021-06-26 ASSESSMENT — PAIN - FUNCTIONAL ASSESSMENT: PAIN_FUNCTIONAL_ASSESSMENT: ACTIVITIES ARE NOT PREVENTED

## 2021-06-26 ASSESSMENT — PAIN DESCRIPTION - DESCRIPTORS: DESCRIPTORS: THROBBING;ACHING

## 2021-06-26 ASSESSMENT — PAIN SCALES - GENERAL: PAINLEVEL_OUTOF10: 7

## 2021-06-26 ASSESSMENT — PAIN DESCRIPTION - PAIN TYPE: TYPE: ACUTE PAIN

## 2021-06-26 ASSESSMENT — PAIN DESCRIPTION - LOCATION: LOCATION: MOUTH

## 2021-06-27 NOTE — ED PROVIDER NOTES
[facility]  Department of Emergency Medicine   ED  Encounter Note  Admit Date/RoomTime: 2021  8:08 PM  ED Room:     NAME: Ruth De Jesus  : 2000  MRN: [id number]     Chief Complaint:  Dental Pain (Lower Back Right  ~  Pt Requesting WORK EXCUSE)    History of Present Illness        Ruth De Jesus is a 24 y.o. old male who presents to the emergency department with a complaint of dental pain. Patient states his right lower tooth has been hurting him now for the past 1 day. He denies any injury. Denies eating anything hard and hurting that tooth. Patient denies ear pain. Denies sore throat. He rates the pain a 7 out of 10. Nothing seems to make it better or worse. Onset:       Spontaneous:   yes. Following Trauma:   no.     Previous Caries:   no.     Recent Dental Procedure:   no.     ROS   Pertinent positives and negatives are stated within HPI, all other systems reviewed and are negative. Past Medical History:  has a past medical history of Depression, History of cardiovascular stress test, and HIV (human immunodeficiency virus infection) ([facility] [address] Surgical History:  has a past surgical history that includes ECHO Compl W Dop Color Flow (2014). Social History:  reports that he has never smoked. He has never used smokeless tobacco. He reports current alcohol use. He reports current drug use. Drug: Marijuana. Family History: family history is not on file. Allergies: Patient has no known allergies. Physical Exam   Oxygen Saturation Interpretation: Normal.        ED Triage Vitals [21]   BP Temp Temp Source Pulse Resp SpO2 Height Weight   115/77 98 °F (36.7 °C) Temporal 60 18 98 % 5' 4\" (1.626 m) 289 lb (131.1 kg)                  General:  NAD. Alert and Oriented. Well-appearing. Skin:  Warm, dry. No rashes. Head:  Normocephalic. Atraumatic. Eyes:  EOMI. Conjunctiva normal.  ENT:  Oral mucosa moist.  Airway patent.  Posterior pharynx pink without erythema, without swelling, without exudate. Uvula midline with equal rise and without edema. Bilateral ear canals patent with minimal cerumen. Bilateral TM's without erythema, without bulging. Nasal turbinates with minimal swelling. Frontal and maxillary sinuses nontender to palpation. Dental: Pain on palpation to third right lower molar. The gums just posterior to that are also swollen. Patient states he is never been evaluated for any wisdom tooth issues or impaction. No visible dental abscess. Remainder of teeth overall look really well. No cavities or erosions. No gingivitis. Neck:  Supple. Normal ROM. Respiratory:  No respiratory distress. No labored breathing. Lungs clear without rales, rhonchi or wheezing. Cardiovascular:  Regular rate. No Murmur. No peripheral edema. Extremities warm and good color. Extremities:  Normal ROM. Nontender to palpation. Atraumatic. Back:  Normal ROM. Nontender to palpation. Neuro:  Alert and Oriented to person, place, time and situation. Normal LOC. Moves all extremities. Speech fluent. Psych:  Calm and Cooperative. Normal thought process. Normal judgement. Lab / Imaging Results   (All laboratory and radiology results have been personally reviewed by myself)  Labs:  No results found for this visit on 06/26/21. Imaging: All Radiology results interpreted by Radiologist unless otherwise noted. No orders to display     ED Course / Medical Decision Making   Medications - No data to display     Consult(s):   Dental Resident was not consulted to see patient regarding complaint. Procedure(s):   None    Plan of Care/Counseling:  Physician Assistant on duty reviewed today's visit with the patient in addition to providing specific details for the plan of care and counseling regarding the diagnosis and prognosis. Questions are answered at this time and are agreeable with the plan. .    Assessment      1.  Pain, dental New Problem     Plan   Discharged. Patient condition is good    New Medications     New Prescriptions    IBUPROFEN (IBU) 600 MG TABLET    Take 1 tablet by mouth every 6 hours as needed for Pain    PENICILLIN V POTASSIUM (VEETID) 500 MG TABLET    Take 1 tablet by mouth 4 times daily for 10 days     Electronically signed by GILDA Zuniga   DD: 6/26/21  **This report was transcribed using voice recognition software. Every effort was made to ensure accuracy; however, inadvertent computerized transcription errors may be present.   END OF ED PROVIDER NOTE       Jennifer Zuniga  06/26/21 2024

## 2021-08-05 ENCOUNTER — HOSPITAL ENCOUNTER (EMERGENCY)
Age: 21
Discharge: HOME | End: 2021-08-05
Payer: MEDICAID

## 2021-08-05 ENCOUNTER — NURSE TRIAGE (OUTPATIENT)
Dept: OTHER | Facility: CLINIC | Age: 21
End: 2021-08-05

## 2021-08-05 VITALS
OXYGEN SATURATION: 96 % | HEIGHT: 64 IN | SYSTOLIC BLOOD PRESSURE: 122 MMHG | HEART RATE: 73 BPM | RESPIRATION RATE: 18 BRPM | DIASTOLIC BLOOD PRESSURE: 75 MMHG | TEMPERATURE: 98.6 F | BODY MASS INDEX: 46.95 KG/M2 | WEIGHT: 275 LBS

## 2021-08-05 DIAGNOSIS — J06.9 ACUTE UPPER RESPIRATORY INFECTION: ICD-10-CM

## 2021-08-05 DIAGNOSIS — J20.9 ACUTE BRONCHITIS, UNSPECIFIED ORGANISM: Primary | ICD-10-CM

## 2021-08-05 PROCEDURE — 99211 OFF/OP EST MAY X REQ PHY/QHP: CPT

## 2021-08-05 RX ORDER — BROMPHENIRAMINE MALEATE, PSEUDOEPHEDRINE HYDROCHLORIDE, AND DEXTROMETHORPHAN HYDROBROMIDE 2; 30; 10 MG/5ML; MG/5ML; MG/5ML
10 SYRUP ORAL 3 TIMES DAILY PRN
Qty: 120 ML | Refills: 0 | Status: SHIPPED | OUTPATIENT
Start: 2021-08-05 | End: 2021-10-26 | Stop reason: ALTCHOICE

## 2021-08-05 RX ORDER — DOXYCYCLINE HYCLATE 100 MG
100 TABLET ORAL 2 TIMES DAILY
Qty: 14 TABLET | Refills: 0 | Status: SHIPPED | OUTPATIENT
Start: 2021-08-05 | End: 2021-08-12

## 2021-08-05 ASSESSMENT — PAIN SCALES - GENERAL: PAINLEVEL_OUTOF10: 0

## 2021-08-05 NOTE — TELEPHONE ENCOUNTER
Reason for Disposition   [1] Continuous (nonstop) coughing interferes with work or school AND [2] no improvement using cough treatment per Care Advice    Answer Assessment - Initial Assessment Questions  1. ONSET: \"When did the cough begin? \"       Started last week -\"maybe the week before last\"    2. SEVERITY: \"How bad is the cough today? \"       Missed work due to the cough     3. RESPIRATORY DISTRESS: \"Describe your breathing. \"       Shortness of breath-when I start to cough     4. FEVER: \"Do you have a fever? \" If so, ask: \"What is your temperature, how was it measured, and when did it start? \"      Denies     5. SPUTUM: \"Describe the color of your sputum\" (clear, white, yellow, green)      \"regualr mucous\"- yellow /brown     6. HEMOPTYSIS: \"Are you coughing up any blood? \" If so ask: \"How much? \" (flecks, streaks, tablespoons, etc.)      Denies -\"this happened a month ago- was in the hospital- not this time\"    7. CARDIAC HISTORY: \"Do you have any history of heart disease? \" (e.g., heart attack, congestive heart failure)       Denies     8. LUNG HISTORY: \"Do you have any history of lung disease? \"  (e.g., pulmonary embolus, asthma, emphysema)      Asthma     9. PE RISK FACTORS: \"Do you have a history of blood clots? \" (or: recent major surgery, recent prolonged travel, bedridden)      Denies     10. OTHER SYMPTOMS: \"Do you have any other symptoms? \" (e.g., runny nose, wheezing, chest pain)        Runny nose, headaches- felt faint yesterday but not today, chest pain when coughing     11. PREGNANCY: \"Is there any chance you are pregnant? \" \"When was your last menstrual period? \"        N/A     12. TRAVEL: \"Have you traveled out of the country in the last month? \" (e.g., travel history, exposures)       Denies    Protocols used: COUGH - ACUTE PRODUCTIVE-ADULT-    Received call from Eusebio See at [facility] with [employer]. Brief description of triage: coughing that interferes with work. See assessment above. Triage indicates for patient to See PCP in 24 hours. Care advice provided, patient verbalizes understanding; denies any other questions or concerns; instructed to call back for any new or worsening symptoms. Writer provided warm transfer to [employer] at [employer] for appointment scheduling. Attention Provider: Thank you for allowing me to participate in the care of your patient. The patient was connected to triage in response to information provided to the ECC. Please do not respond through this encounter as the response is not directed to a shared pool.

## 2021-08-05 NOTE — LETTER
[facility]  [address] [place] [id number]  Phone: [phone]               August 5, 2021    Patient: Libra Trevino   YOB: 2000   Date of Visit: 8/5/2021       To Whom It May Concern:    Libra Trevino was seen and treated in our emergency department on 8/5/2021. He may return to work on August 6, 2021.       Sincerely,       Tra Pierce PA-C         Signature:__________________________________

## 2021-08-05 NOTE — ED PROVIDER NOTES
[facility]  Department of Emergency Medicine  UC Encounter Note  21   5:37 PM EDT      NAME: Zach Trevino  :  2000  MRN:  [id number]    Chief Complaint: Cough, Shortness of Breath, and Nasal Congestion      This is a 22-year-old male that presents to urgent care complaining of cough and chest congestion for the past couple days. Also has had some URI symptoms as well. States he recently tested negative for Covid several days ago. Denies abdominal pain nausea vomiting diarrhea or urinary symptoms. On first contact patient he appears to be in no acute distress. Patient is a smoker he states. Review of Systems  Pertinent positives and negatives are stated within HPI, all other systems reviewed and are negative. Physical Exam  Vitals and nursing note reviewed. Constitutional:       Appearance: He is well-developed. HENT:      Head: Normocephalic and atraumatic. Jaw: There is normal jaw occlusion. No trismus. Right Ear: Hearing, ear canal and external ear normal. Tympanic membrane is bulging. Left Ear: Hearing, ear canal and external ear normal. Tympanic membrane is bulging. Nose:      Right Sinus: Maxillary sinus tenderness present. No frontal sinus tenderness. Left Sinus: Maxillary sinus tenderness present. No frontal sinus tenderness. Mouth/Throat:      Mouth: Mucous membranes are moist. No angioedema. Pharynx: Oropharynx is clear. Uvula midline. No pharyngeal swelling, oropharyngeal exudate, posterior oropharyngeal erythema or uvula swelling. Comments: Oropharynx is patent. Eyes:      General: Lids are normal.      Conjunctiva/sclera: Conjunctivae normal.      Pupils: Pupils are equal, round, and reactive to light. Cardiovascular:      Rate and Rhythm: Normal rate and regular rhythm. Heart sounds: Normal heart sounds. No murmur heard.      Pulmonary:      Effort: Pulmonary effort is normal.      Breath sounds: Normal breath sounds. Comments: Occasional moist cough noted. Abdominal:      General: Bowel sounds are normal.      Palpations: Abdomen is soft. Abdomen is not rigid. Tenderness: There is no abdominal tenderness. There is no guarding or rebound. Musculoskeletal:      Cervical back: Full passive range of motion without pain, normal range of motion and neck supple. Skin:     General: Skin is warm and dry. Findings: No abrasion or rash. Neurological:      Mental Status: He is alert and oriented to person, place, and time. GCS: GCS eye subscore is 4. GCS verbal subscore is 5. GCS motor subscore is 6. Cranial Nerves: Cranial nerves are intact. No cranial nerve deficit. Sensory: Sensation is intact. No sensory deficit. Motor: Motor function is intact. Coordination: Coordination is intact. Coordination normal.      Gait: Gait is intact. Gait normal.         Procedures    MDM  Number of Diagnoses or Management Options  Acute bronchitis, unspecified organism  Acute upper respiratory infection  Diagnosis management comments: No acute distress. Vital signs within normal limits. Placed on Bromfed-DM as well as doxycycline. Told to follow-up with primary care provider in a week if not better. --------------------------------------------- PAST HISTORY ---------------------------------------------  Past Medical History:  has a past medical history of ADHD, Depression, History of cardiovascular stress test, and HIV (human immunodeficiency virus infection) ([facility] [address] Past Surgical History:  has a past surgical history that includes ECHO Compl W Dop Color Flow (1/23/2014). Social History:  reports that he has been smoking cigarettes. He has never used smokeless tobacco. He reports current alcohol use. He reports current drug use. Drug: Marijuana. Family History: family history is not on file. The patients home medications have been reviewed.     Allergies: Patient has no known allergies. -------------------------------------------------- RESULTS -------------------------------------------------  No results found for this visit on 08/05/21. No orders to display       ------------------------- NURSING NOTES AND VITALS REVIEWED ---------------------------   The nursing notes within the ED encounter and vital signs as below have been reviewed. /75   Pulse 73   Temp 98.6 °F (37 °C) (Infrared)   Resp 18   Ht 5' 4\" (1.626 m)   Wt 275 lb (124.7 kg)   SpO2 96%   BMI 47.20 kg/m²   Oxygen Saturation Interpretation: Normal      ------------------------------------------ PROGRESS NOTES ------------------------------------------   I have spoken with the patient and discussed todays results, in addition to providing specific details for the plan of care and counseling regarding the diagnosis and prognosis. Their questions are answered at this time and they are agreeable with the plan.      --------------------------------- ADDITIONAL PROVIDER NOTES ---------------------------------     This patient is stable for discharge. I have shared the specific conditions for return, as well as the importance of follow-up. * NOTE: This report was transcribed using voice recognition software. Every effort was made to ensure accuracy; however, inadvertent computerized transcription errors may be present.    --------------------------------- IMPRESSION AND DISPOSITION ---------------------------------    IMPRESSION  1. Acute bronchitis, unspecified organism    2.  Acute upper respiratory infection        DISPOSITION  Disposition: Discharge to home  Patient condition is good       Alejandro Le PA-C  08/05/21 1436

## 2021-08-06 ENCOUNTER — CARE COORDINATION (OUTPATIENT)
Dept: CARE COORDINATION | Age: 21
End: 2021-08-06

## 2021-08-18 ENCOUNTER — HOSPITAL ENCOUNTER (OUTPATIENT)
Age: 21
Discharge: HOME | End: 2021-08-18
Payer: MEDICAID

## 2021-08-18 DIAGNOSIS — Z21 ASYMPTOMATIC HIV INFECTION (HCC): ICD-10-CM

## 2021-08-18 LAB
ALBUMIN SERPL-MCNC: 4.1 G/DL (ref 3.5–5.2)
ALP BLD-CCNC: 79 U/L (ref 40–129)
ALT SERPL-CCNC: 52 U/L (ref 0–40)
ANION GAP SERPL CALCULATED.3IONS-SCNC: 10 MMOL/L (ref 7–16)
AST SERPL-CCNC: 30 U/L (ref 0–39)
BASOPHILS ABSOLUTE: 0.02 E9/L (ref 0–0.2)
BASOPHILS RELATIVE PERCENT: 0.3 % (ref 0–2)
BILIRUB SERPL-MCNC: 0.3 MG/DL (ref 0–1.2)
BUN BLDV-MCNC: 13 MG/DL (ref 6–20)
CALCIUM SERPL-MCNC: 9.2 MG/DL (ref 8.6–10.2)
CHLORIDE BLD-SCNC: 104 MMOL/L (ref 98–107)
CHOLESTEROL, TOTAL: 225 MG/DL (ref 0–199)
CO2: 25 MMOL/L (ref 22–29)
CREAT SERPL-MCNC: 0.9 MG/DL (ref 0.7–1.2)
EOSINOPHILS ABSOLUTE: 0.11 E9/L (ref 0.05–0.5)
EOSINOPHILS RELATIVE PERCENT: 1.8 % (ref 0–6)
GFR AFRICAN AMERICAN: >60
GFR NON-AFRICAN AMERICAN: >60 ML/MIN/1.73
GLUCOSE BLD-MCNC: 87 MG/DL (ref 74–99)
HBA1C MFR BLD: 5.8 % (ref 4–5.6)
HCT VFR BLD CALC: 47 % (ref 37–54)
HDLC SERPL-MCNC: 41 MG/DL
HEMOGLOBIN: 15.2 G/DL (ref 12.5–16.5)
IMMATURE GRANULOCYTES #: 0.02 E9/L
IMMATURE GRANULOCYTES %: 0.3 % (ref 0–5)
LDL CHOLESTEROL CALCULATED: 164 MG/DL (ref 0–99)
LYMPHOCYTES ABSOLUTE: 2.7 E9/L (ref 1.5–4)
LYMPHOCYTES RELATIVE PERCENT: 44.7 % (ref 20–42)
MCH RBC QN AUTO: 29.1 PG (ref 26–35)
MCHC RBC AUTO-ENTMCNC: 32.3 % (ref 32–34.5)
MCV RBC AUTO: 89.9 FL (ref 80–99.9)
MONOCYTES ABSOLUTE: 0.49 E9/L (ref 0.1–0.95)
MONOCYTES RELATIVE PERCENT: 8.1 % (ref 2–12)
NEUTROPHILS ABSOLUTE: 2.7 E9/L (ref 1.8–7.3)
NEUTROPHILS RELATIVE PERCENT: 44.8 % (ref 43–80)
PDW BLD-RTO: 13.2 FL (ref 11.5–15)
PLATELET # BLD: 328 E9/L (ref 130–450)
PMV BLD AUTO: 9.8 FL (ref 7–12)
POTASSIUM SERPL-SCNC: 3.8 MMOL/L (ref 3.5–5)
RBC # BLD: 5.23 E12/L (ref 3.8–5.8)
SODIUM BLD-SCNC: 139 MMOL/L (ref 132–146)
TOTAL PROTEIN: 7.9 G/DL (ref 6.4–8.3)
TRIGL SERPL-MCNC: 100 MG/DL (ref 0–149)
VLDLC SERPL CALC-MCNC: 20 MG/DL
WBC # BLD: 6 E9/L (ref 4.5–11.5)

## 2021-08-18 PROCEDURE — 85025 COMPLETE CBC W/AUTO DIFF WBC: CPT

## 2021-08-18 PROCEDURE — 86360 T CELL ABSOLUTE COUNT/RATIO: CPT

## 2021-08-18 PROCEDURE — 36415 COLL VENOUS BLD VENIPUNCTURE: CPT

## 2021-08-18 PROCEDURE — 86359 T CELLS TOTAL COUNT: CPT

## 2021-08-18 PROCEDURE — 80053 COMPREHEN METABOLIC PANEL: CPT

## 2021-08-18 PROCEDURE — 80061 LIPID PANEL: CPT

## 2021-08-18 PROCEDURE — 83036 HEMOGLOBIN GLYCOSYLATED A1C: CPT

## 2021-08-20 LAB
ABSOLUTE CD 3: 2055 CELLS/UL (ref 570–2400)
ABSOLUTE CD 4 HELPER: 916 CELLS/UL (ref 430–1800)
ABSOLUTE CD 8 (SUPP): 1069 CELLS/UL (ref 210–1200)
CD4/CD8 RATIO: 0.86 RATIO (ref 0.8–3.9)
LYMPH SUBSET INFORMATION: NORMAL

## 2021-08-23 LAB
DIRECT EXAM: NORMAL
SPECIMEN: NORMAL

## 2021-09-09 ENCOUNTER — HOSPITAL ENCOUNTER (EMERGENCY)
Age: 21
Discharge: HOME | End: 2021-09-09
Payer: MEDICAID

## 2021-09-09 VITALS
SYSTOLIC BLOOD PRESSURE: 122 MMHG | HEART RATE: 57 BPM | RESPIRATION RATE: 20 BRPM | HEIGHT: 64 IN | OXYGEN SATURATION: 98 % | WEIGHT: 275 LBS | TEMPERATURE: 98.6 F | BODY MASS INDEX: 46.95 KG/M2 | DIASTOLIC BLOOD PRESSURE: 84 MMHG

## 2021-09-09 DIAGNOSIS — R10.9 ABDOMINAL PAIN, UNSPECIFIED ABDOMINAL LOCATION: Primary | ICD-10-CM

## 2021-09-09 PROCEDURE — 99211 OFF/OP EST MAY X REQ PHY/QHP: CPT

## 2021-09-10 NOTE — ED PROVIDER NOTES
[facility] [facility]  Department of Emergency Medicine  UC Encounter Note  21   8:37 PM EDT      NAME: Matt Emery  :  2000  MRN:  [id number]    Chief Complaint: Headache (started  this weekend  with chest congetion  cough loss of taste  smell    cough), Cough, and Neck Pain (started this weekend)      This is a 68-year-old male that presents to urgent care with multiple complaints. One of his primary complaint is abdominal pain. He has been having some abdominal pain for the last several days. He also complains of some body aches as well. I first contact patient he appears to be in no acute distress. Review of Systems  Pertinent positives and negatives are stated within HPI, all other systems reviewed and are negative. Physical Exam  Vitals and nursing note reviewed. Constitutional:       Appearance: He is well-developed. HENT:      Head: Normocephalic and atraumatic. Jaw: No trismus. Right Ear: Hearing, tympanic membrane, ear canal and external ear normal.      Left Ear: Hearing, tympanic membrane, ear canal and external ear normal.      Nose: Nose normal.      Right Sinus: No maxillary sinus tenderness or frontal sinus tenderness. Left Sinus: No maxillary sinus tenderness or frontal sinus tenderness. Mouth/Throat:      Pharynx: Uvula midline. No uvula swelling. Eyes:      General: Lids are normal.      Conjunctiva/sclera: Conjunctivae normal.      Pupils: Pupils are equal, round, and reactive to light. Cardiovascular:      Rate and Rhythm: Normal rate and regular rhythm. Heart sounds: Normal heart sounds. No murmur heard. Pulmonary:      Effort: Pulmonary effort is normal.      Breath sounds: Normal breath sounds. Abdominal:      General: Bowel sounds are normal.      Palpations: Abdomen is soft. Abdomen is not rigid. Tenderness:  There is abdominal tenderness in the right lower quadrant, epigastric area, periumbilical area, suprapubic area and left lower quadrant. There is no guarding or rebound. Positive signs include McBurney's sign. Musculoskeletal:      Cervical back: Normal range of motion and neck supple. Skin:     General: Skin is warm and dry. Findings: No abrasion or rash. Neurological:      Mental Status: He is alert and oriented to person, place, and time. GCS: GCS eye subscore is 4. GCS verbal subscore is 5. GCS motor subscore is 6. Cranial Nerves: No cranial nerve deficit. Sensory: No sensory deficit. Coordination: Coordination normal.      Gait: Gait normal.         Procedures    MDM  Number of Diagnoses or Management Options  Abdominal pain, unspecified abdominal location  Diagnosis management comments: Patient very tender with palpation to his abdomen. Recommend he go to ER immediately after discharge from urgent care today for further evaluation of this abdominal pain. He also has been complaining of some body aches as well. He does state that he has had a cough. Also he had some decreased sense of smell. He is in no acute distress he can go to ER by private vehicle.           --------------------------------------------- PAST HISTORY ---------------------------------------------  Past Medical History:  has a past medical history of ADHD, Depression, History of cardiovascular stress test, and HIV (human immunodeficiency virus infection) ([facility] [address] Past Surgical History:  has a past surgical history that includes ECHO Compl W Dop Color Flow (1/23/2014). Social History:  reports that he has been smoking cigarettes. He has never used smokeless tobacco. He reports current alcohol use. He reports current drug use. Drug: Marijuana. Family History: family history is not on file. The patients home medications have been reviewed. Allergies: Patient has no known allergies.     -------------------------------------------------- RESULTS -------------------------------------------------  No results found for this visit on 09/09/21. No orders to display       ------------------------- NURSING NOTES AND VITALS REVIEWED ---------------------------   The nursing notes within the ED encounter and vital signs as below have been reviewed. /84   Pulse 57   Temp 98.6 °F (37 °C) (Infrared)   Resp 20   Ht 5' 4\" (1.626 m)   Wt 275 lb (124.7 kg)   SpO2 98%   BMI 47.20 kg/m²   Oxygen Saturation Interpretation: Normal      ------------------------------------------ PROGRESS NOTES ------------------------------------------   I have spoken with the patient and discussed todays results, in addition to providing specific details for the plan of care and counseling regarding the diagnosis and prognosis. Their questions are answered at this time and they are agreeable with the plan.      --------------------------------- ADDITIONAL PROVIDER NOTES ---------------------------------     This patient is stable for discharge. I have shared the specific conditions for return, as well as the importance of follow-up. * NOTE: This report was transcribed using voice recognition software. Every effort was made to ensure accuracy; however, inadvertent computerized transcription errors may be present.    --------------------------------- IMPRESSION AND DISPOSITION ---------------------------------    IMPRESSION  1. Abdominal pain, unspecified abdominal location        DISPOSITION  Disposition: Discharge to ED  Patient condition is stable.        Christie Lantigua PA-C  09/09/21 2046

## 2021-10-13 ENCOUNTER — TELEPHONE (OUTPATIENT)
Dept: PRIMARY CARE CLINIC | Age: 21
End: 2021-10-13

## 2021-10-13 NOTE — TELEPHONE ENCOUNTER
----- Message from Napoleon Dong sent at 10/12/2021  1:27 PM EDT -----  Subject: Appointment Request    Reason for Call: Routine (Patient Request) No Script    QUESTIONS  Type of Appointment? Established Patient  Reason for appointment request? No appointments available during search  Additional Information for Provider? PT called in to schedule an appt for   Right Shoulder pain and When the ECC was scheduling we did not see the   [facility] as an option for the appt. Please call this PT back to   schedule.   ---------------------------------------------------------------------------  --------------  CALL BACK INFO  What is the best way for the office to contact you? Do not leave any   message, patient will call back for answer  Preferred Call Back Phone Number? [phone]  ---------------------------------------------------------------------------  --------------  SCRIPT ANSWERS  Relationship to Patient? Self  (Is the patient requesting to see the provider for a procedure?)? No  (Is the patient requesting to see the provider urgently  today or   tomorrow. )? No  Have you been diagnosed with, awaiting test results for, or told that you   are suspected of having COVID-19 (Coronavirus)? (If patient has tested   negative or was tested as a requirement for work, school, or travel and   not based on symptoms, answer no)? No  Within the past two weeks have you developed any of the following symptoms   (answer no if symptoms have been present longer than 2 weeks or began   more than 2 weeks ago)? Fever or Chills, Cough, Shortness of breath or   difficulty breathing, Loss of taste or smell, Sore throat, Nasal   congestion, Sneezing or runny nose, Fatigue or generalized body aches   (answer no if pain is specific to a body part e.g. back pain), Diarrhea,   Headache? No  Have you had close contact with someone with COVID-19 in the last 14 days?    No  (Service Expert  click yes below to proceed with [employer] As Usual   Scheduling)?  Yes

## 2021-10-26 ENCOUNTER — OFFICE VISIT (OUTPATIENT)
Dept: PRIMARY CARE CLINIC | Age: 21
End: 2021-10-26
Payer: MEDICAID

## 2021-10-26 VITALS
DIASTOLIC BLOOD PRESSURE: 68 MMHG | WEIGHT: 300.1 LBS | BODY MASS INDEX: 51.23 KG/M2 | HEIGHT: 64 IN | OXYGEN SATURATION: 96 % | TEMPERATURE: 97.7 F | SYSTOLIC BLOOD PRESSURE: 112 MMHG | HEART RATE: 90 BPM

## 2021-10-26 DIAGNOSIS — M75.41 IMPINGEMENT SYNDROME OF RIGHT SHOULDER: ICD-10-CM

## 2021-10-26 DIAGNOSIS — M25.511 ACUTE PAIN OF RIGHT SHOULDER: Primary | ICD-10-CM
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
## 2021-10-26 PROCEDURE — G8484 FLU IMMUNIZE NO ADMIN: HCPCS | Performed by: FAMILY MEDICINE

## 2021-10-26 PROCEDURE — G8417 CALC BMI ABV UP PARAM F/U: HCPCS | Performed by: FAMILY MEDICINE

## 2021-10-26 PROCEDURE — 4004F PT TOBACCO SCREEN RCVD TLK: CPT | Performed by: FAMILY MEDICINE

## 2021-10-26 PROCEDURE — G8427 DOCREV CUR MEDS BY ELIG CLIN: HCPCS | Performed by: FAMILY MEDICINE

## 2021-10-26 PROCEDURE — 99213 OFFICE O/P EST LOW 20 MIN: CPT | Performed by: FAMILY MEDICINE

## 2021-10-26 RX ORDER — METHYLPREDNISOLONE 4 MG/1
TABLET ORAL
Qty: 1 KIT | Refills: 0 | Status: SHIPPED | OUTPATIENT
Start: 2021-10-26 | End: 2021-11-01

## 2021-10-26 NOTE — PROGRESS NOTES
Subjective:  24 y.o. male who presents to the office today with chief complaint:  Chief Complaint   Patient presents with    Shoulder Pain     Right shoulder pain, needs a note for work that he can not left heavy objects. Right shoulder pain: Began several since summer. Uncertain where he initially injured the shoulder- feels it may be from work. Shoulder hurts constantly- pain will wax and wane. Pain decreased with advil. 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 Fall: Silvio Schroeder at work today- had just jumped on his truck- tripped on a plastic strip. Landed on R knee- now having pain and discomfort. ROM with some discomfort. 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 Health Maintenance Due   Topic Date Due    Varicella vaccine (1 of 2 - 2-dose childhood series) Never done    Pneumococcal 0-64 years Vaccine (1 of 4 - PCV13) Never done    COVID-19 Vaccine (1) Never done    Hepatitis A vaccine (2 of 2 - Risk 2-dose series) 09/05/2019    Flu vaccine (1) 09/01/2021       Cancer History:  Family Cancer History:    Review of Systems    Review of Systems: All bolded are positive, all others are negative. General:  Fever, chills, diaphoresis, fatigue, malaise, night sweats, weight loss  Psychological:  Anxiety, disorientation, hallucinations. ENT:  Epistaxis, headaches, vertigo, visual changes. Cardiovascular:  Chest pain, irregular heartbeats, palpitations, paroxysmal nocturnal dyspnea. Respiratory:  Shortness of breath, coughing, sputum production, hemoptysis, wheezing, orthopnea.   Gastrointestinal:  Nausea, vomiting, diarrhea, heartburn, constipation, abdominal pain, hematemesis, hematochezia, melena, acholic stools  Genito-Urinary:  Dysuria, urgency, frequency, hematuria  Musculoskeletal:  Joint pain, joint stiffness, joint swelling, muscle pain  Neurology:  Headache, focal neurological deficits, weakness, numbness, paresthesia  Derm:  Rashes, ulcers, excoriations, bruising  Extremities:  Decreased ROM, peripheral edema, mottling      Objective:  Vitals:    10/26/21 1112   BP: 112/68   Pulse: 90   Temp: 97.7 °F (36.5 °C)   SpO2: 96%     Physical Exam  Constitutional:       General: He is not in acute distress. Appearance: He is obese. He is not ill-appearing or toxic-appearing. HENT:      Head: Normocephalic and atraumatic. Right Ear: External ear normal.      Left Ear: External ear normal.   Musculoskeletal:      Comments: Active and passive range of motion of the right shoulder is full in all planes, as is the right elbow. Manual muscle testing of the right shoulder, elbow, wrist decreased to 4/5 in all planes due to pain. Maryagnes Estimable and Neer's impingement both positive. Right shoulder globally tender to palpate. Right knee range of motion is full. Tracie's negative. Anterior and posterior drawer negative. Tenderness to palpate medial joint line. No significant swelling or erythema noted. Patient ambulates with an antalgic gait pattern with decreased weightbearing through the right lower extremity   Neurological:      Mental Status: He is alert.            Results for orders placed or performed during the hospital encounter of 08/18/21   HIV RNA, Quantitative, PCR   Result Value Ref Range    Direct Exam SEE BELOW     Specimen Plasma    LIPID PANEL   Result Value Ref Range    Cholesterol, Total 225 (H) 0 - 199 mg/dL    Triglycerides 100 0 - 149 mg/dL    HDL 41 >40 mg/dL    LDL Calculated 164 (H) 0 - 99 mg/dL    VLDL Cholesterol Calculated 20 mg/dL   HEMOGLOBIN A1C   Result Value Ref Range    Hemoglobin A1C 5.8 (H) 4.0 - 5.6 %   COMPREHENSIVE METABOLIC PANEL   Result Value Ref Range    Sodium 139 132 - 146 mmol/L    Potassium 3.8 3.5 - 5.0 mmol/L    Chloride 104 98 - 107 mmol/L    CO2 25 22 - 29 mmol/L    Anion Gap 10 7 - 16 mmol/L    Glucose 87 74 - 99 mg/dL    BUN 13 6 - 20 mg/dL    CREATININE 0.9 0.7 - 1.2 mg/dL    GFR Non-African American >60 >=60 mL/min/1.73    GFR African American >60     Calcium 9.2 8.6 - 10.2 mg/dL    Total Protein 7.9 6.4 - 8.3 g/dL    Albumin 4.1 3.5 - 5.2 g/dL    Total Bilirubin 0.3 0.0 - 1.2 mg/dL    Alkaline Phosphatase 79 40 - 129 U/L    ALT 52 (H) 0 - 40 U/L    AST 30 0 - 39 U/L   CBC WITH AUTO DIFFERENTIAL   Result Value Ref Range    WBC 6.0 4.5 - 11.5 E9/L    RBC 5.23 3.80 - 5.80 E12/L    Hemoglobin 15.2 12.5 - 16.5 g/dL    Hematocrit 47.0 37.0 - 54.0 %    MCV 89.9 80.0 - 99.9 fL    MCH 29.1 26.0 - 35.0 pg    MCHC 32.3 32.0 - 34.5 %    RDW 13.2 11.5 - 15.0 fL    Platelets 645 819 - 208 E9/L    MPV 9.8 7.0 - 12.0 fL    Neutrophils % 44.8 43.0 - 80.0 %    Immature Granulocytes % 0.3 0.0 - 5.0 %    Lymphocytes % 44.7 (H) 20.0 - 42.0 %    Monocytes % 8.1 2.0 - 12.0 %    Eosinophils % 1.8 0.0 - 6.0 %    Basophils % 0.3 0.0 - 2.0 %    Neutrophils Absolute 2.70 1.80 - 7.30 E9/L    Immature Granulocytes # 0.02 E9/L    Lymphocytes Absolute 2.70 1.50 - 4.00 E9/L    Monocytes Absolute 0.49 0.10 - 0.95 E9/L    Eosinophils Absolute 0.11 0.05 - 0.50 E9/L    Basophils Absolute 0.02 0.00 - 0.20 E9/L   T + B Lymphocyte Differential   Result Value Ref Range    Lymph Subset Information See Note     Absolute CD 3 2055 570 - 2400 cells/uL    Absolute CD 8 (Supp) 1069 210 - 1200 cells/uL    CD4/CD8 Ratio 0.86 0.80 - 3.90 ratio    Absolute CD 4 Amelia 916 430 - 1800 cells/uL         Assessment and Plan:  Patsy was seen today for shoulder pain. Diagnoses and all orders for this visit:    Acute pain of right shoulder  -     XR SHOULDER RIGHT (MIN 2 VIEWS); Future  -     methylPREDNISolone (MEDROL DOSEPACK) 4 MG tablet; Take by mouth. -     [facility], [facility]    Fall, initial encounter  -     XR KNEE RIGHT (1-2 VIEWS); Future    Impingement syndrome of right shoulder  -     methylPREDNISolone (MEDROL DOSEPACK) 4 MG tablet; Take by mouth.   -     [facility], [facility]        1. Acute right shoulder pain: Impingement syndrome versus rotator cuff tear versus subacromial bursitis. Physical therapy. X-ray. Medrol Dosepak. Letter for work for [employer] duty. 2. Acute right knee pain: X-rays will be completed. Return in about 6 weeks (around 12/7/2021) for chronic disease management. Patient may come in sooner if needed for medical concerns. Patient advised to call at any time to cancel, re-schedule, or for any questions/concerns.             Sadie Goode,     10/26/21  2:44 PM

## 2021-10-26 NOTE — LETTER
[address]  [address] [place] [address]  Phone: [phone]  Fax: [phone] [facility] [address], DO        October 26, 2021     Patient: Bernardo Sorto   YOB: 2000   Date of Visit: 10/26/2021       To Whom It May Concern: It is my medical opinion that Bernardo Ohs may return to light duty immediately with the following restrictions: No lifting, pushing, pulling >10 pounds. .    If you have any questions or concerns, please don't hesitate to call.     Sincerely,        Diaz Piper, DO

## 2021-11-11 ENCOUNTER — HOSPITAL ENCOUNTER (OUTPATIENT)
Dept: GENERAL RADIOLOGY | Age: 21
Discharge: HOME | End: 2021-11-13
Payer: MEDICAID

## 2021-11-11 ENCOUNTER — HOSPITAL ENCOUNTER (OUTPATIENT)
Age: 21
Discharge: HOME | End: 2021-11-13
Payer: MEDICAID

## 2021-11-11 DIAGNOSIS — M25.511 ACUTE PAIN OF RIGHT SHOULDER: ICD-10-CM
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
## 2021-11-11 PROCEDURE — 73030 X-RAY EXAM OF SHOULDER: CPT

## 2021-11-11 PROCEDURE — 73562 X-RAY EXAM OF KNEE 3: CPT

## 2021-11-30 ENCOUNTER — EVALUATION (OUTPATIENT)
Dept: PHYSICAL THERAPY | Age: 21
End: 2021-11-30
Payer: MEDICAID

## 2021-11-30 DIAGNOSIS — M75.41 IMPINGEMENT SYNDROME OF RIGHT SHOULDER: Primary | ICD-10-CM

## 2021-11-30 PROCEDURE — 97162 PT EVAL MOD COMPLEX 30 MIN: CPT | Performed by: PHYSICAL THERAPIST

## 2021-11-30 NOTE — PROGRESS NOTES
Physical Therapy Daily Treatment Note    Date: 2021  Patient Name: Corinne Borja  : 2000   MRN: [id number]  [facility]: 1 month  DOSx: None   Referring Provider: Rory Reagan DO  [address]  [address] [facility] [address] [facility] [address]     Medical Diagnosis:      Diagnosis Orders   1. Impingement syndrome of right shoulder         Outcome Measure: Quick Dash: 70% Impairment    S: See eval  O: Pt given written HEP  Time 1185-3493     Visit 1 Repeat outcome measure at mid point and end. Pain 5/10     ROM See eval     Modalities            Manual                  Stretch      Table slides      Wall Walk Flex      Wall Walk ABD      Wall Pec/ER Stretch      Wall ER Stretch      Towel IR Stretch      Cross Body Adduction      Supine Stretch with Arms Behind Head            Exercise      UBE          Pulleys - Flex NEXT     Pulleys - IR NEXT     Supine Wand Flex NEXT     Supine Wand Bench Press NEXT     Supine Wand ER/IR NEXT     Standing Wand IR NEXT     Standing Wand Scaption NEXT     Standing Wand Flex      Standing Wand Shoulder Press      Standing Wand ER/IR      Shrugs AROM       Pendulum Ex            Supine Flex      S-lying ABD      S-lying ER            Prone Flexion      Prone Ext      Prone Row with ER      Prone Horizontal ABD            Standing Flex      Standing ER      Standing IR      Standing Scaption       Standing ABD      Standing Shoulder Press       Functional activities To aid in ROM and strength needed for reaching , lifting ,pushing and pulling at home/work    ROWS: H       ROWS: M  \"    ROWS: L  \"    ER  \"    IR  \"    Punches      Shoulder Press      Shoulder Flex      Shoulder ABD             Endurance     Shoulder Flexion with Ball on Wall      Shoulder Flexion with Nuts and Bolts            Weighted Machines      Lat Pull Down      Vertical Row      A:  Tolerated well. Above added to written HEP.   P: Continue with rehab plan  Arnold Mendez PT    Treatment Charges: Mins Units Initial Evaluation 40 1   Re-Evaluation     Ther Exercise         TE     Manual Therapy     MT     Ther Activities        TA     Gait Training          GT     Neuro Re-education NR     Modalities     Non-Billable Service Time     Other     Total Time/Units 40 1

## 2021-11-30 NOTE — PROGRESS NOTES
[address] [facility] OUTPATIENT REHABILITATION  PHYSICAL THERAPY INITIAL EVALUATION         Date:  2021   Patient: Negra Griffith  : 2000  MRN: [id number]  Referring Provider: Catrahcito Reeves DO  [address] [facility]  [address] [place] [address]  [facility] [address]     Medical Diagnosis:      Diagnosis Orders   1. Impingement syndrome of right shoulder         Physician Order: Eval and Treat      SUBJECTIVE:     Onset date: 2 years, worst since fall at work on 10/28/21    Onset: Sudden     History / Mechanism of Injury: Pt stated that he had sporadic pain in his R shoulder prior to fall at work but he was still functional with decent strength. Following fall at work, pt R shoulder has consistently had pain with dysfunction and difficulty with lifting. Interventions for current problem: none    Chief complaint: pain, decreased motion, decreased mobility, weakness    Behavior: condition is staying the same    Pain: constant  Current: 9/10     Best: 4/10     Worst:10/10 (occurs with activity). Pain returns to baseline in a half hour    Symptom Type / Quality: aching  Location[de-identified] noted above anterior, mid-deltoid region        Aggravated by: reaching overhead, reaching out, reaching behind back, lifting/carrying/material handling    Relieved by: medication    Imaging results: XR SHOULDER RIGHT (MIN 2 VIEWS)    Result Date: 2021  EXAMINATION: THREE XRAY VIEWS OF THE RIGHT SHOULDER 2021 3:38 pm COMPARISON: None. HISTORY: ORDERING SYSTEM PROVIDED HISTORY: Acute pain of right shoulder FINDINGS: Glenohumeral joint is normally aligned. No evidence of acute fracture or dislocation. No abnormal periarticular calcifications. [facility] joint is unremarkable in appearance. Visualized lung is unremarkable. No acute abnormality. XR KNEE RIGHT (3 VIEWS)    Result Date: 2021  EXAMINATION: THREE XRAY VIEWS OF THE RIGHT KNEE 2021 3:38 pm COMPARISON: None.  HISTORY: ORDERING SYSTEM PROVIDED []  Smita's []+ / [] -    []GH drawer []+ / [] -    [] Bicep Load []+ / [] -   [] Crank []+ / [] -  [] Marye Chock []+ / [] -   [] Elbow Varus []+ / [] -  [] Neer's [x]+ / [] -      [] Speed's []+ / [] -   []Sulcus Sign []+ / [] -   [] Apprehension []+ / [] -   [] Bicep Load II []+ / [] -   [] Arliss Meuse []+ / [] -   [] Elbow Valgus []+ / [] -     [] Other: []+ / [] -         ASSESSMENT     Pt to benefit from skilled PT services in order to improve ROM, strength, functional mobility, endurance, and decrease pain in R shoulder. Pt had reproduction of pain with Anell Oats test and Neers test during evaluation consistent with subacromial impingement. Pt to receive stretches for stiffness and strengthening exercises to improve upon global weakess and stability of GL joint. Outcome Measure:   QuickDASH (Disorders of the Arm, Shoulder, and Hand) 70% disability    Problems:   Pain 10/10 intermittent  ROM decreased  Strength decreased   Limitations with ADLs , use of right upper extremity, bending, reaching, lifting, carrying      [x] There are no barriers affecting plan of care or recovery    [] Barriers to this patient's plan of care or recovery include:    Domestic Concerns:  [x] No  [] Yes:    Short Term goals (2-3 weeks)   Pain 6/10 worst pain   ROM [facility]   Strength 3+/5 R shoulder    Able to perform / complete the following functions / tasks: ADLs, hobbies, yard work, reach / lift / carry light weighted items in performance of home or work demands, return to exercise regimen  with less pain.  QuickDASH (Disorders of the Arm, Shoulder, and Hand) 55% disability    Long Term goals (4-6 weeks)   Pain 0-4/10   ROM WNL   Strength 4-/5 R shoulder    Able to perform / complete the following functions / tasks: ADLs, hobbies, yard work, reach / lift / carry light weighted items in performance of home or work demands, return to exercise regimen  with no/minimal pain.     QuickDASH 0-40% disability   Independent with Home Exercise Programs    Rehab Potential: [x] Good  [] Fair  [] Poor    PLAN       Treatment Plan:   instruction in home exercise program   therapeutic exercise   therapeutic activity   manual therapy   cold / hot pack  electrical stimulation     The following CPT codes are likely to be used in the care of this patient:   71855 PT Evaluation: Moderate Complexity     Suggested Professional Referral: [x] No  [] Yes:     Patient Education:  [x] Plans / Goals, Risks / Benefits discussed  [x] Home exercise program  Method of Education: [x] Verbal  [x] Demo  [x] Written  Comprehension of Education:  [x] Verbalizes understanding. [x] Demonstrates understanding. [] Needs Review. [] Demonstrates / verbalizes understanding of HEP / Jettie Leer previously given. Frequency:  1-2 days per week for 4-6 weeks    Patient understands diagnosis/prognosis and consents to treatment, plan and goals: [x] Yes    [] No     Thank you for the opportunity to work with your patient. If you have questions or comments, please contact me at [phone]; fax: [phone]. Electronically signed by: Facundo Fragoso, PT    Medicare Patients Only     Please sign Physician's Certification and return to: [address] [address]  Dept: [phone]  Dept Fax: [phone] Certification / Comments     Frequency/Duration 1-2 days per week for 4-6 weeks. Certification period from 11/30/2021  to 03/01/2022. I have reviewed the Plan of Care established for skilled therapy services and certify that the services are required and that they will be provided while the patient is under my care.     Physician's Comments/Revisions:               Physician's Printed Name:                                           [de-identified] Signature:                                                               Date:

## 2021-12-02 ENCOUNTER — TELEPHONE (OUTPATIENT)
Dept: PRIMARY CARE CLINIC | Age: 21
End: 2021-12-02

## 2021-12-02 NOTE — TELEPHONE ENCOUNTER
Left message for patient stating his appt for today 12/2/2021 is canceled due to Dr. Arianna Dolan not being in the office. Told patient to call back and reschedule.

## 2021-12-06 ENCOUNTER — TELEPHONE (OUTPATIENT)
Dept: PHYSICAL THERAPY | Age: 21
End: 2021-12-06

## 2021-12-08 ENCOUNTER — TREATMENT (OUTPATIENT)
Dept: PHYSICAL THERAPY | Age: 21
End: 2021-12-08
Payer: MEDICAID

## 2021-12-08 DIAGNOSIS — M75.41 IMPINGEMENT SYNDROME OF RIGHT SHOULDER: Primary | ICD-10-CM

## 2021-12-08 PROCEDURE — 97110 THERAPEUTIC EXERCISES: CPT

## 2021-12-08 NOTE — PROGRESS NOTES
Physical Therapy Daily Treatment Note    Date: 2021  Patient Name: Pamela Peña  : 2000   MRN: [id number]  [facility]: 1 month  DOSx: None     Referring Provider:   Leo Richard DO  [address] [place] [address] [address]  [address] [place] [address] [facility] [address]         Medical Diagnosis:    Diagnosis Orders   1. Impingement syndrome of right shoulder         Outcome Measure: Quick Dash: 70% Impairment    S: Patient slept on it wrong where he has increased pain from that. O: Patient arrived 10 minutes late to appointment  Time [phone]     Visit 2 Repeat outcome measure at mid point and end.     Pain 8/10     ROM AROM: 125° Forward elevation,  65° ER,  IR to 30  PROM: 130° Forward elevation,  70° ER,  40° IR On eval    Modalities            Manual                  Stretch      Table slides      Wall Walk Flex      Wall Walk ABD      Wall Pec/ER Stretch      Wall ER Stretch      Towel IR Stretch      Cross Body Adduction      Supine Stretch with Arms Behind Head            Exercise      UBE          Pulleys - Flex 3 min  TE   Pulleys - IR NEXT  TE   Supine Wand Flex 3 x 10  TE   Supine Wand Bench Press 3 x 10  TE   Supine Wand ER/IR 2 x 10  TE   Standing Wand IR 2 x 10  TE   Standing Wand Scaption 2 x 10  TE   Standing Wand Flex x     Standing Wand Shoulder Press x     Standing Wand ER/IR      Shrugs AROM       Pendulum Ex            Supine Flex x     S-lying ABD      S-lying ER x           Prone Flexion      Prone Ext      Prone Row with ER      Prone Horizontal ABD            Standing Flex      Standing ER      Standing IR      Standing Scaption       Standing ABD      Standing Shoulder Press       Functional activities To aid in ROM and strength needed for reaching , lifting ,pushing and pulling at home/work    ROWS: H       ROWS: M  \"    ROWS: L  \"    ER  \"    IR  \"    Punches      Shoulder Press      Shoulder Flex      Shoulder ABD             Endurance     Shoulder Flexion with Ball on Wall      Shoulder Flexion with Nuts and Bolts            Weighted Machines      Lat Pull Down      Vertical Row      A:  Tolerated well.    P: Continue with rehab plan  Maryse Dooley PTA    Treatment Charges: Mins Units   Initial Evaluation     Re-Evaluation     Ther Exercise         TE 30 2   Manual Therapy     MT     Ther Activities        TA     Gait Training          GT     Neuro Re-education NR     Modalities     Non-Billable Service Time     Other     Total Time/Units 30 2

## 2021-12-17 ENCOUNTER — TREATMENT (OUTPATIENT)
Dept: PHYSICAL THERAPY | Age: 21
End: 2021-12-17
Payer: MEDICAID

## 2021-12-17 DIAGNOSIS — M75.41 IMPINGEMENT SYNDROME OF RIGHT SHOULDER: Primary | ICD-10-CM

## 2021-12-17 PROCEDURE — 97110 THERAPEUTIC EXERCISES: CPT

## 2021-12-17 NOTE — PROGRESS NOTES
Physical Therapy Daily Treatment Note    Date: 2021  Patient Name: Negra Griffith  : 2000   MRN: [id number]  [facility]: 1 month  DOSx: None     Referring Provider:   Catrachito Reeves DO  [address]  [address] [facility] [address]         Medical Diagnosis:    Diagnosis Orders   1. Impingement syndrome of right shoulder       Access Code: [email or web address]  URL: [email or web address]  Date: 2021  Prepared by: Edgar Ply    Exercises  Standing Shoulder Flexion Full Range - 1 x daily - 2-3 sets - 10 reps  Standing Shoulder Internal Rotation AROM Behind Back - 1 x daily - 2-3 sets - 10 reps  Standing Single Arm Shoulder External Rotation - 1 x daily - 2-3 sets - 10 reps    Outcome Measure: Quick Dash: 70% Impairment    S: Pt reports 7/10 pain today. States he was moving something and hit his shoulder  O:   Time 1722-2034     Visit 3 Repeat outcome measure at mid point and end.     Pain 7/10     ROM AROM: 125° Forward elevation,  65° ER,  IR to 30  PROM: 130° Forward elevation,  70° ER,  40° IR On eval    Modalities            Manual                  Stretch      Table slides      Wall Walk Flex      Wall Walk ABD      Wall Pec/ER Stretch      Wall ER Stretch      Towel IR Stretch      Cross Body Adduction      Supine Stretch with Arms Behind Head            Exercise      UBE          Pulleys - Flex 3 min  TE   Pulleys - IR 3 min  TE   Supine Wand Flex 3 x 10  TE   Supine Wand Bench Press 3 x 10  TE   Supine Wand ER/IR 2 x 10  TE   Standing Wand IR 2 x 10  TE   Standing Wand Scaption 2 x 10  TE   Standing Wand Flex x     Standing Wand Shoulder Press x     Standing Wand ER/IR      Shrugs AROM       Pendulum Ex            Supine Flex x     S-lying ABD      S-lying ER x           Prone Flexion      Prone Ext      Prone Row with ER      Prone Horizontal ABD            Standing Flex Added to HEP     Standing ER Added to HEP 0*     Standing IR Added to HEP     Standing Scaption       Standing ABD Standing Shoulder Press       Functional activities To aid in ROM and strength needed for reaching , lifting ,pushing and pulling at home/work    ROWS: H       ROWS: M  \"    ROWS: L  \"    ER  \"    IR  \"    Punches      Shoulder Press      Shoulder Flex      Shoulder ABD             Endurance     Shoulder Flexion with Ball on Wall      Shoulder Flexion with Nuts and Bolts            Weighted Machines      Lat Pull Down      Vertical Row      A:  Tolerated well. Soreness with IR/ER rotation but otherwise no complaints  P: Pt is moving to [facility]. He is going to call Dr Hermila Salomon for a new perscription so he can continue PT in [facility]. Pt was given and instructed in a home exercise program to continue until he can get into a new physical therapy clinic.   Gina Pierre PTA    Treatment Charges: Mins Units   Initial Evaluation     Re-Evaluation     Ther Exercise         TE 35 2   Manual Therapy     MT     Ther Activities        TA     Gait Training          GT     Neuro Re-education NR     Modalities     Non-Billable Service Time     Other     Total Time/Units 35 2

## 2021-12-21 ENCOUNTER — TELEPHONE (OUTPATIENT)
Dept: PRIMARY CARE CLINIC | Age: 21
End: 2021-12-21

## 2021-12-21 NOTE — TELEPHONE ENCOUNTER
----- Message from [facility] sent at 12/17/2021  1:05 PM EST -----  Subject: Message to Provider    QUESTIONS  Information for Provider? Patient is looking for a referral for physical   therapy he is moving and needs to go else where he did not have a location   or fax please call back and assist.  ---------------------------------------------------------------------------  --------------  [address]  What is the best way for the office to contact you? OK to leave message on   voicemail  Preferred Call Back Phone Number? [phone]  ---------------------------------------------------------------------------  --------------  SCRIPT ANSWERS  Relationship to Patient?  Self
Message was left for patient, we need to know where he would like to go for physical therapy or where he is moving to.
done

## 2022-09-08 ENCOUNTER — OFFICE VISIT (OUTPATIENT)
Dept: FAMILY MEDICINE CLINIC | Age: 22
End: 2022-09-08
Payer: MEDICAID

## 2022-09-08 VITALS
HEART RATE: 72 BPM | BODY MASS INDEX: 50.82 KG/M2 | TEMPERATURE: 97.8 F | RESPIRATION RATE: 18 BRPM | DIASTOLIC BLOOD PRESSURE: 84 MMHG | WEIGHT: 297.7 LBS | SYSTOLIC BLOOD PRESSURE: 136 MMHG | HEIGHT: 64 IN | OXYGEN SATURATION: 99 %

## 2022-09-08 DIAGNOSIS — M25.512 ACUTE PAIN OF LEFT SHOULDER: Primary | ICD-10-CM

## 2022-09-08 DIAGNOSIS — B20 HIV INFECTION, UNSPECIFIED SYMPTOM STATUS (HCC): ICD-10-CM

## 2022-09-08 LAB
ALBUMIN SERPL-MCNC: 4.2 G/DL (ref 3.5–5.2)
ALP BLD-CCNC: 117 U/L (ref 40–129)
ALT SERPL-CCNC: 48 U/L (ref 0–40)
ANION GAP SERPL CALCULATED.3IONS-SCNC: 10 MMOL/L (ref 7–16)
AST SERPL-CCNC: 26 U/L (ref 0–39)
BASOPHILS ABSOLUTE: 0.03 E9/L (ref 0–0.2)
BASOPHILS RELATIVE PERCENT: 0.5 % (ref 0–2)
BILIRUB SERPL-MCNC: 0.3 MG/DL (ref 0–1.2)
BILIRUBIN URINE: NEGATIVE
BLOOD, URINE: NEGATIVE
BUN BLDV-MCNC: 13 MG/DL (ref 6–20)
CALCIUM SERPL-MCNC: 9.7 MG/DL (ref 8.6–10.2)
CHLORIDE BLD-SCNC: 102 MMOL/L (ref 98–107)
CHOLESTEROL, TOTAL: 263 MG/DL (ref 0–199)
CLARITY: CLEAR
CO2: 27 MMOL/L (ref 22–29)
COLOR: YELLOW
CREAT SERPL-MCNC: 0.8 MG/DL (ref 0.7–1.2)
EOSINOPHILS ABSOLUTE: 0.1 E9/L (ref 0.05–0.5)
EOSINOPHILS RELATIVE PERCENT: 1.7 % (ref 0–6)
GFR AFRICAN AMERICAN: >60
GFR NON-AFRICAN AMERICAN: >60 ML/MIN/1.73
GLUCOSE BLD-MCNC: 98 MG/DL (ref 74–99)
GLUCOSE URINE: NEGATIVE MG/DL
HCT VFR BLD CALC: 44.3 % (ref 37–54)
HDLC SERPL-MCNC: 47 MG/DL
HEMOGLOBIN: 14.5 G/DL (ref 12.5–16.5)
IMMATURE GRANULOCYTES #: 0.02 E9/L
IMMATURE GRANULOCYTES %: 0.3 % (ref 0–5)
KETONES, URINE: NEGATIVE MG/DL
LDL CHOLESTEROL CALCULATED: 201 MG/DL (ref 0–99)
LEUKOCYTE ESTERASE, URINE: NEGATIVE
LYMPHOCYTES ABSOLUTE: 2.49 E9/L (ref 1.5–4)
LYMPHOCYTES RELATIVE PERCENT: 41.7 % (ref 20–42)
MCH RBC QN AUTO: 28.8 PG (ref 26–35)
MCHC RBC AUTO-ENTMCNC: 32.7 % (ref 32–34.5)
MCV RBC AUTO: 88.1 FL (ref 80–99.9)
MONOCYTES ABSOLUTE: 0.74 E9/L (ref 0.1–0.95)
MONOCYTES RELATIVE PERCENT: 12.4 % (ref 2–12)
NEUTROPHILS ABSOLUTE: 2.59 E9/L (ref 1.8–7.3)
NEUTROPHILS RELATIVE PERCENT: 43.4 % (ref 43–80)
NITRITE, URINE: NEGATIVE
PDW BLD-RTO: 13.5 FL (ref 11.5–15)
PH UA: 6 (ref 5–9)
PLATELET # BLD: 337 E9/L (ref 130–450)
PMV BLD AUTO: 10 FL (ref 7–12)
POTASSIUM SERPL-SCNC: 4.5 MMOL/L (ref 3.5–5)
PROTEIN UA: NEGATIVE MG/DL
RBC # BLD: 5.03 E12/L (ref 3.8–5.8)
SODIUM BLD-SCNC: 139 MMOL/L (ref 132–146)
SPECIFIC GRAVITY UA: 1.02 (ref 1–1.03)
TOTAL PROTEIN: 7.1 G/DL (ref 6.4–8.3)
TRIGL SERPL-MCNC: 77 MG/DL (ref 0–149)
UROBILINOGEN, URINE: 0.2 E.U./DL
VITAMIN D 25-HYDROXY: 19 NG/ML (ref 30–100)
VLDLC SERPL CALC-MCNC: 15 MG/DL
WBC # BLD: 6 E9/L (ref 4.5–11.5)

## 2022-09-08 PROCEDURE — 4004F PT TOBACCO SCREEN RCVD TLK: CPT | Performed by: NURSE PRACTITIONER

## 2022-09-08 PROCEDURE — G8427 DOCREV CUR MEDS BY ELIG CLIN: HCPCS | Performed by: NURSE PRACTITIONER

## 2022-09-08 PROCEDURE — 99214 OFFICE O/P EST MOD 30 MIN: CPT | Performed by: NURSE PRACTITIONER

## 2022-09-08 PROCEDURE — G8417 CALC BMI ABV UP PARAM F/U: HCPCS | Performed by: NURSE PRACTITIONER

## 2022-09-08 RX ORDER — CYCLOBENZAPRINE HCL 5 MG
5 TABLET ORAL 3 TIMES DAILY PRN
Qty: 15 TABLET | Refills: 0 | Status: SHIPPED
Start: 2022-09-08 | End: 2022-10-25

## 2022-09-08 RX ORDER — NAPROXEN 500 MG/1
TABLET ORAL
Qty: 30 TABLET | Refills: 0 | Status: SHIPPED
Start: 2022-09-08 | End: 2022-10-25

## 2022-09-08 RX ORDER — BICTEGRAVIR SODIUM, EMTRICITABINE, AND TENOFOVIR ALAFENAMIDE FUMARATE 50; 200; 25 MG/1; MG/1; MG/1
1 TABLET ORAL DAILY
COMMUNITY
Start: 2022-02-07 | End: 2022-09-14 | Stop reason: SDUPTHER

## 2022-09-08 NOTE — PROGRESS NOTES
22  Patsy Killian : 2000 Sex: male  Age 25 y.o. Subjective:  Chief Complaint   Patient presents with    Shoulder Pain     Pain in bilateral shoulders that has been since 2021 when he was injured working for [employer], he has an established worker's compensation case but has not seen his physician of record in a long time. He has noticed for the last month that the pain is radiating into the left shoulder blade and left pectoral muscle with movement. HPI:   Malgorzata Hampton , 25 y.o. male presents to the clinic for evaluation of chronic left shoulder pain, worse over the past month. The patient reports associated radiating pain into chest area with movement. The patient denies acute known injury. The patient reports symptoms worsen with activity / ROM. The patient has not taken any treatment for current symptoms. The patient reports unchanged symptoms over time. Denies any erythema, edema, ecchymosis, paresthesia, and loss of ROM / strength. The patient also denies headache, fever, chest pain, abdominal pain, shortness of breath, and nausea / vomiting / diarrhea. ROS:   Unless otherwise stated in this report the patient's positive and negative responses for review of systems for constitutional, eyes, ENT, cardiovascular, respiratory, gastrointestinal, neurological, , musculoskeletal, and integument systems and related systems to the presenting problem are either stated in the history of present illness or were not pertinent or were negative for the symptoms and/or complaints related to the presenting medical problem. Positives and pertinent negatives as per HPI. All others reviewed and are negative.       PMH:     Past Medical History:   Diagnosis Date    ADHD     Depression     History of cardiovascular stress test 2014    regular treadmill stress test    HIV (human immunodeficiency virus infection) ([facility] [address]        Past Surgical History:   Procedure Laterality Date    ECHO COMPL W DOP COLOR FLOW  1/23/2014            History reviewed. No pertinent family history. Medications:     Current Outpatient Medications:     bictegravir-emtricitab-tenofovir alafenamide (BIKTARVY) -25 MG TABS per tablet, Take 1 tablet by mouth daily, Disp: , Rfl:     naproxen (NAPROSYN) 500 MG tablet, 1 tablet, 2 times a day as needed for pain, Disp: 30 tablet, Rfl: 0    cyclobenzaprine (FLEXERIL) 5 MG tablet, Take 1 tablet by mouth 3 times daily as needed for Muscle spasms, Disp: 15 tablet, Rfl: 0    guanFACINE (INTUNIV) 2 MG TB24 extended release tablet, , Disp: , Rfl:     LATUDA 20 MG TABS tablet, , Disp: , Rfl:     Allergies:   No Known Allergies    Social History:     Social History     Tobacco Use    Smoking status: Some Days     Types: Cigarettes    Smokeless tobacco: Never   Vaping Use    Vaping Use: Never used   Substance Use Topics    Alcohol use: Yes     Comment: Socially    Drug use: Yes     Types: Marijuana Flex Powell)       Physical Exam:     Vitals:    09/08/22 1149   BP: 136/84   Pulse: 72   Resp: 18   Temp: 97.8 °F (36.6 °C)   TempSrc: Temporal   SpO2: 99%   Weight: 297 lb 11.2 oz (135 kg)   Height: 5' 4\" (1.626 m)       Physical Exam (PE)   Constitutional: Alert, development consistent with age. HENT:      Head: Normocephalic. Right Ear: External ear normal.      Left Ear: External ear normal.      Nose: Normal.      Mouth/Throat:     Mouth: Mucous membranes are moist.      Pharynx: Oropharynx is clear. Eyes: Pupils: Pupils are equal, round, and reactive to light. Neck: Normal ROM. Supple. Cardiovascular: Heart RRR without pathologic murmurs or gallops. Pulmonary: Respiratory effort normal.  Normal breath sounds. Abdomen: Soft, nontender, normal bowel sounds. Shoulder: Left            Tenderness: TTP over left upper chest and shoulder without any bony point tenderness. Swelling: No obvious swelling noted. Deformity: No obvious deformity. ROM: Limited ROM due to pain. UE/ strength 5/5 bilaterally. Apley Scratch Test: positive            Empty Can Test: positive            Hawkin-Weston Test: positive             Drop Test: negative   Elbow: Left             Tenderness:  No pain with ROM or palpation. Swelling: No edema noted. ROM: FROM without deficits. No pain with supination or pronation of the hand. Neurovascular:              Sensory deficit: Sensation intact proximally and distally to the injury site. Pulse deficit: Distal pulses 2+ and bounding. Capillary refill: Less then 2 sec throughout. Skin:  No ecchymosis, abrasions, bruising, or erythema noted. Lymphatics: No lymphangitis or adenopathy noted. Neurological: Alert and oriented. Motor functions intact. Psychiatric: Mood and Affect: Mood normal. Behavior: Behavior normal    Testing:   (All laboratory and radiology results have been personally reviewed by myself)  Labs:  No results found for this visit on 09/08/22. Imaging: All Radiology results interpreted by Radiologist unless otherwise noted. No orders to display       Assessment / Plan:   The patient's vitals, allergies, medications, and past medical history have been reviewed. Patsy was seen today for shoulder pain. Diagnoses and all orders for this visit:    Acute pain of left shoulder  -     XR SHOULDER LEFT (MIN 2 VIEWS); Future  -     naproxen (NAPROSYN) 500 MG tablet; 1 tablet, 2 times a day as needed for pain  -     cyclobenzaprine (FLEXERIL) 5 MG tablet; Take 1 tablet by mouth 3 times daily as needed for Muscle spasms      - Disposition: Home    - Educational material printed for patient's review and were included in patient instructions. After Visit Summary was given to patient at the end of visit. - The patient is to call for any concerns or return if any changing symptoms.  The patient is to follow-up with PCP in the next 2-3 days for repeat evaluation. Discussed symptomatic treatments with patient today. The patient is instructed to avoid activities that exacerbate pain, RICE therapy, and take Tylenol prn for pain. Red flags were discussed with the patient today. If symptoms worsen the patient is to go directly to the emergency department. Pt verbalizes understanding and is in agreement with plan of care. All questions answered. SIGNATURE: Kris Whitfield, APRN-CNP    *NOTE: This report was transcribed using voice recognition software. Every effort was made to ensure accuracy; however, inadvertent computerized transcription errors may be present.

## 2022-09-09 LAB — RPR: NORMAL

## 2022-09-10 LAB
ABSOLUTE CD 3: 2142 CELLS/UL (ref 570–2400)
ABSOLUTE CD 4 HELPER: 834 CELLS/UL (ref 430–1800)
ABSOLUTE CD 8 (SUPP): 1240 CELLS/UL (ref 210–1200)
CD4/CD8 RATIO: 0.67 RATIO (ref 0.8–3.9)
LYMPH SUBSET INFORMATION: ABNORMAL

## 2022-09-12 LAB
C. TRACHOMATIS DNA ,URINE: NEGATIVE
N. GONORRHOEAE DNA, URINE: NEGATIVE
SOURCE: NORMAL

## 2022-09-15 LAB
HIV-1 RNA BY PCR, QN: NOT DETECTED
SOURCE: NORMAL

## 2022-10-25 ENCOUNTER — OFFICE VISIT (OUTPATIENT)
Dept: FAMILY MEDICINE CLINIC | Age: 22
End: 2022-10-25
Payer: MEDICAID

## 2022-10-25 VITALS
HEART RATE: 80 BPM | HEIGHT: 64 IN | OXYGEN SATURATION: 98 % | WEIGHT: 300 LBS | BODY MASS INDEX: 51.22 KG/M2 | SYSTOLIC BLOOD PRESSURE: 136 MMHG | DIASTOLIC BLOOD PRESSURE: 82 MMHG | TEMPERATURE: 97.9 F | RESPIRATION RATE: 18 BRPM

## 2022-10-25 DIAGNOSIS — Z23 FLU VACCINE NEED: ICD-10-CM

## 2022-10-25 DIAGNOSIS — Z00.00 ROUTINE PHYSICAL EXAMINATION: Primary | ICD-10-CM

## 2022-10-25 PROCEDURE — 99395 PREV VISIT EST AGE 18-39: CPT | Performed by: STUDENT IN AN ORGANIZED HEALTH CARE EDUCATION/TRAINING PROGRAM

## 2022-10-25 PROCEDURE — 90471 IMMUNIZATION ADMIN: CPT | Performed by: STUDENT IN AN ORGANIZED HEALTH CARE EDUCATION/TRAINING PROGRAM

## 2022-10-25 PROCEDURE — G8482 FLU IMMUNIZE ORDER/ADMIN: HCPCS | Performed by: STUDENT IN AN ORGANIZED HEALTH CARE EDUCATION/TRAINING PROGRAM

## 2022-10-25 PROCEDURE — 90674 CCIIV4 VAC NO PRSV 0.5 ML IM: CPT | Performed by: STUDENT IN AN ORGANIZED HEALTH CARE EDUCATION/TRAINING PROGRAM

## 2022-10-25 SDOH — ECONOMIC STABILITY: FOOD INSECURITY: WITHIN THE PAST 12 MONTHS, YOU WORRIED THAT YOUR FOOD WOULD RUN OUT BEFORE YOU GOT MONEY TO BUY MORE.: NEVER TRUE

## 2022-10-25 SDOH — ECONOMIC STABILITY: FOOD INSECURITY: WITHIN THE PAST 12 MONTHS, THE FOOD YOU BOUGHT JUST DIDN'T LAST AND YOU DIDN'T HAVE MONEY TO GET MORE.: NEVER TRUE

## 2022-10-25 ASSESSMENT — ENCOUNTER SYMPTOMS
ABDOMINAL PAIN: 0
VOMITING: 0
CONSTIPATION: 0
RHINORRHEA: 0
CHEST TIGHTNESS: 0
SHORTNESS OF BREATH: 0
DIARRHEA: 0
NAUSEA: 0
EYE PAIN: 0
COUGH: 0

## 2022-10-25 ASSESSMENT — SOCIAL DETERMINANTS OF HEALTH (SDOH): HOW HARD IS IT FOR YOU TO PAY FOR THE VERY BASICS LIKE FOOD, HOUSING, MEDICAL CARE, AND HEATING?: NOT HARD AT ALL

## 2022-10-25 ASSESSMENT — PATIENT HEALTH QUESTIONNAIRE - PHQ9
1. LITTLE INTEREST OR PLEASURE IN DOING THINGS: 1
SUM OF ALL RESPONSES TO PHQ QUESTIONS 1-9: 1
2. FEELING DOWN, DEPRESSED OR HOPELESS: 0
SUM OF ALL RESPONSES TO PHQ9 QUESTIONS 1 & 2: 1

## 2022-10-25 NOTE — PROGRESS NOTES
10/25/2022    [facility]  Chief Complaint   Patient presents with    [facility]    Shoulder Pain     Since last year october    Knee Pain     Injured, for a few weeks       Eliza Brooks is a 25 y.o. male who  has a past medical history of ADHD, Depression, History of cardiovascular stress test, and HIV (human immunodeficiency virus infection) ([facility] [address] Annual exam:  Patient is here for routine yearly physical/preventative visit. Patient has no complaints or concerns today. Patient is  generally healthy. Chronic medical conditions are generally controlled. Most recent labs reviewed with patient and  are remarkable. Health maintenance reviewed with patient and is  up to date. Overall doing well. Patient does have shoulder and knee pain from a work injury, is dealing with  at this time     Review of Systems   Constitutional:  Negative for chills, fatigue and fever. HENT:  Negative for congestion, ear pain, postnasal drip and rhinorrhea. Eyes:  Negative for pain. Respiratory:  Negative for cough, chest tightness and shortness of breath. Cardiovascular:  Negative for chest pain. Gastrointestinal:  Negative for abdominal pain, constipation, diarrhea, nausea and vomiting. Genitourinary:  Negative for difficulty urinating, dysuria and frequency. Musculoskeletal:  Positive for arthralgias. Skin:  Negative for rash. Neurological:  Negative for dizziness, light-headedness, numbness and headaches. Prior to Visit Medications    Medication Sig Taking?  Authorizing Provider   bictegravir-emtricitab-tenofovir alafenamide (BIKTARVY) -25 MG TABS per tablet Take 1 tablet by mouth daily Yes Alphonso Finley MD        No Known Allergies    Past Medical History:   Diagnosis Date    ADHD     Depression     History of cardiovascular stress test 01/23/2014    regular treadmill stress test    HIV (human immunodeficiency virus infection) [facility])        Past Surgical History:   Procedure Laterality Date    ECHO COMPL W DOP COLOR FLOW  1/23/2014            Social History     Socioeconomic History    Marital status: Single     Spouse name: Not on file    Number of children: Not on file    Years of education: Not on file    Highest education level: Not on file   Occupational History    Not on file   Tobacco Use    Smoking status: Some Days     Types: Cigarettes     Passive exposure: Never    Smokeless tobacco: Never   Vaping Use    Vaping Use: Never used   Substance and Sexual Activity    Alcohol use: Yes     Comment: Socially    Drug use: Yes     Types: Marijuana [facility])    Sexual activity: Not on file   Other Topics Concern    Not on file   Social History Narrative    Not on file     Social Determinants of Health     Financial Resource Strain: Low Risk     Difficulty of Paying Living Expenses: Not hard at all   Food Insecurity: No Food Insecurity    Worried About Running Out of Food in the Last Year: Never true    Ran Out of Food in the Last Year: Never true   Transportation Needs: Not on file   Physical Activity: Not on file   Stress: Not on file   Social Connections: Not on file   Intimate Partner Violence: Not on file   Housing Stability: Not on file        History reviewed. No pertinent family history. Vitals:    10/25/22 0951   BP: 136/82   Pulse: 80   Resp: 18   Temp: 97.9 °F (36.6 °C)   TempSrc: Temporal   SpO2: 98%   Weight: 300 lb (136.1 kg)   Height: 5' 4\" (1.626 m)     Estimated body mass index is 51.49 kg/m² as calculated from the following:    Height as of this encounter: 5' 4\" (1.626 m). Weight as of this encounter: 300 lb (136.1 kg).     Most Recent Labs  CBC  Lab Results   Component Value Date/Time    WBC 6.0 09/08/2022 11:19 AM    WBC 6.0 08/18/2021 09:34 AM    WBC 6.5 05/28/2021 07:49 PM    RBC 5.03 09/08/2022 11:19 AM    RBC 5.23 08/18/2021 09:34 AM    RBC 5.40 05/28/2021 07:49 PM    HGB 14.5 09/08/2022 11:19 AM    HGB 15.2 08/18/2021 09:34 AM    HGB 15.9 05/28/2021 07:49 PM    HCT 44.3 09/08/2022 11:19 AM    HCT 47.0 08/18/2021 09:34 AM    HCT 48.9 05/28/2021 07:49 PM    MCV 88.1 09/08/2022 11:19 AM    MCV 89.9 08/18/2021 09:34 AM    MCV 90.6 05/28/2021 07:49 PM     09/08/2022 11:19 AM     08/18/2021 09:34 AM     05/28/2021 07:49 PM      CMP  Lab Results   Component Value Date/Time     09/08/2022 11:19 AM     08/18/2021 09:34 AM     05/28/2021 07:49 PM    K 4.5 09/08/2022 11:19 AM    K 3.8 08/18/2021 09:34 AM    K 4.1 05/28/2021 07:49 PM    K 4.2 05/12/2021 09:50 AM     09/08/2022 11:19 AM     08/18/2021 09:34 AM     05/28/2021 07:49 PM    CO2 27 09/08/2022 11:19 AM    CO2 25 08/18/2021 09:34 AM    CO2 24 05/28/2021 07:49 PM    ANIONGAP 10 09/08/2022 11:19 AM    ANIONGAP 10 08/18/2021 09:34 AM    ANIONGAP 11 05/28/2021 07:49 PM    GLUCOSE 98 09/08/2022 11:19 AM    GLUCOSE 87 08/18/2021 09:34 AM    GLUCOSE 94 05/28/2021 07:49 PM    BUN 13 09/08/2022 11:19 AM    BUN 13 08/18/2021 09:34 AM    BUN 10 05/28/2021 07:49 PM    CREATININE 0.8 09/08/2022 11:19 AM    CREATININE 0.9 08/18/2021 09:34 AM    CREATININE 0.8 05/28/2021 07:49 PM    LABGLOM >60 09/08/2022 11:19 AM    LABGLOM >60 08/18/2021 09:34 AM    LABGLOM >60 05/28/2021 07:49 PM    GFRAA >60 09/08/2022 11:19 AM    GFRAA >60 08/18/2021 09:34 AM    GFRAA >60 05/28/2021 07:49 PM    CALCIUM 9.7 09/08/2022 11:19 AM    CALCIUM 9.2 08/18/2021 09:34 AM    CALCIUM 9.3 05/28/2021 07:49 PM    PROT 7.1 09/08/2022 11:19 AM    PROT 7.9 08/18/2021 09:34 AM    PROT 8.2 05/28/2021 07:49 PM    LABALBU 4.2 09/08/2022 11:19 AM    LABALBU 4.1 08/18/2021 09:34 AM    LABALBU 4.2 05/28/2021 07:49 PM    BILITOT 0.3 09/08/2022 11:19 AM    BILITOT 0.3 08/18/2021 09:34 AM    BILITOT 0.3 05/28/2021 07:49 PM    ALKPHOS 117 09/08/2022 11:19 AM    ALKPHOS 79 08/18/2021 09:34 AM    ALKPHOS 90 05/28/2021 07:49 PM    AST 26 09/08/2022 11:19 AM    AST 30 08/18/2021 09:34 AM    AST 29 05/28/2021 07:49 PM    ALT 48 09/08/2022 11:19 AM    ALT 52 08/18/2021 09:34 AM    ALT 48 05/28/2021 07:49 PM     A1C  Lab Results   Component Value Date/Time    LABA1C 5.8 08/18/2021 09:34 AM     TSH  Lab Results   Component Value Date/Time    TSH 2.610 02/16/2021 08:50 AM     LIPID  Lab Results   Component Value Date/Time    CHOL 263 09/08/2022 11:19 AM    CHOL 225 08/18/2021 09:34 AM    CHOL 239 02/16/2021 08:50 AM    HDL 47 09/08/2022 11:19 AM    HDL 41 08/18/2021 09:34 AM    HDL 34 02/16/2021 08:50 AM    LDLCALC 201 09/08/2022 11:19 AM    LDLCALC 164 08/18/2021 09:34 AM    LDLCALC 184 02/16/2021 08:50 AM    TRIG 77 09/08/2022 11:19 AM    TRIG 100 08/18/2021 09:34 AM    TRIG 107 02/16/2021 08:50 AM     VITAMIN D  Lab Results   Component Value Date/Time    VITD25 19 09/08/2022 11:19 AM      HEPATITIS C  Lab Results   Component Value Date/Time    HCVABI Non-Reactive 02/16/2021 08:50 AM     UA  Lab Results   Component Value Date/Time    COLORU Yellow 09/08/2022 11:19 AM    COLORU Yellow 05/28/2021 07:46 PM    COLORU Yellow 04/01/2021 10:03 AM    CLARITYU Clear 09/08/2022 11:19 AM    CLARITYU Clear 05/28/2021 07:46 PM    CLARITYU Clear 04/01/2021 10:03 AM    GLUCOSEU Negative 09/08/2022 11:19 AM    GLUCOSEU Negative 05/28/2021 07:46 PM    GLUCOSEU Negative 04/01/2021 10:03 AM    BILIRUBINUR Negative 09/08/2022 11:19 AM    BILIRUBINUR Negative 05/28/2021 07:46 PM    BILIRUBINUR Negative 04/01/2021 10:03 AM    KETUA Negative 09/08/2022 11:19 AM    KETUA Negative 05/28/2021 07:46 PM    KETUA Negative 04/01/2021 10:03 AM    SPECGRAV 1.020 09/08/2022 11:19 AM    SPECGRAV 1.025 05/28/2021 07:46 PM    SPECGRAV >=1.030 04/01/2021 10:03 AM    BLOODU Negative 09/08/2022 11:19 AM    BLOODU Negative 05/28/2021 07:46 PM    BLOODU Negative 04/01/2021 10:03 AM    PHUR 6.0 09/08/2022 11:19 AM    PHUR 6.5 05/28/2021 07:46 PM    PHUR 6.0 04/01/2021 10:03 AM    PROTEINU Negative 09/08/2022 11:19 AM    PROTEINU Negative 05/28/2021 07:46 PM    PROTEINU Negative 04/01/2021 10:03 AM    UROBILINOGEN 0.2 09/08/2022 11:19 AM    UROBILINOGEN 0.2 05/28/2021 07:46 PM    UROBILINOGEN 0.2 04/01/2021 10:03 AM    NITRU Negative 09/08/2022 11:19 AM    NITRU Negative 05/28/2021 07:46 PM    NITRU Negative 04/01/2021 10:03 AM    LEUKOCYTESUR Negative 09/08/2022 11:19 AM    LEUKOCYTESUR Negative 05/28/2021 07:46 PM    LEUKOCYTESUR Negative 04/01/2021 10:03 AM       Physical Exam  Vitals and nursing note reviewed. Constitutional:       Appearance: Normal appearance. He is obese. HENT:      Head: Normocephalic and atraumatic. Right Ear: External ear normal.      Left Ear: External ear normal.   Eyes:      Extraocular Movements: Extraocular movements intact. Pupils: Pupils are equal, round, and reactive to light. Cardiovascular:      Rate and Rhythm: Normal rate and regular rhythm. Pulses: Normal pulses. Heart sounds: Normal heart sounds. Pulmonary:      Effort: Pulmonary effort is normal.      Breath sounds: Normal breath sounds. Abdominal:      General: Bowel sounds are normal.      Palpations: Abdomen is soft. Musculoskeletal:         General: Normal range of motion. Cervical back: Normal range of motion and neck supple. Skin:     General: Skin is warm and dry. Capillary Refill: Capillary refill takes less than 2 seconds. Neurological:      General: No focal deficit present. Mental Status: He is alert and oriented to person, place, and time. Psychiatric:         Mood and Affect: Mood normal.         Behavior: Behavior normal.         Thought Content: Thought content normal.       ASSESSMENT/PLAN:  Patsy was seen today for establish care, shoulder pain and knee pain. Diagnoses and all orders for this visit:    Routine physical examination       As above.   Call or go to the nearest ED immediately if symptoms worsen or persist.  Educational materials and/or home exercises printed for patient's review and were included in patient instructions on his/her After Visit Summary and given to patient at the end of visit. Counseled regarding above diagnosis, including possible risks and complications,  especially if left uncontrolled. Counseled regarding the possible side effects, risks, benefits and alternatives to treatment; patient and/or guardian verbalizes understanding, agrees, feels comfortable with and wishes to proceed with above treatment plan. Advised patient to call with any new medication issues, and read all Rx info from pharmacy to assure aware of all possible risks and side effects of medication before taking. Reviewed age and gender appropriate health screening exams and vaccinations. Advised patient regarding importance of keeping up with recommended health maintenance and to schedule as soon as possible if overdue, as this is important in assessing for undiagnosed pathology, especially cancer, as well as protecting against potentially harmful/life threatening disease. Patient and/or guardian verbalizes understanding and agrees with above counseling, assessment and plan. All questions answered. Return in about 1 year (around 10/25/2023), or if symptoms worsen or fail to improve, for annual.    An  electronic signature was used to authenticate this note. --Ulysses Jj DO on 10/25/2022 at 10:25 AM    This document was prepared at least partially through the use of voice recognition software. Although effort is taken to assure the accuracy of this document, it is possible that grammatical, syntax,  or spelling errors may occur.